# Patient Record
Sex: MALE | Race: WHITE | NOT HISPANIC OR LATINO | URBAN - METROPOLITAN AREA
[De-identification: names, ages, dates, MRNs, and addresses within clinical notes are randomized per-mention and may not be internally consistent; named-entity substitution may affect disease eponyms.]

---

## 2021-06-25 ENCOUNTER — INPATIENT (INPATIENT)
Facility: HOSPITAL | Age: 56
LOS: 5 days | Discharge: HOME | End: 2021-07-01
Attending: HOSPITALIST | Admitting: HOSPITALIST
Payer: MEDICAID

## 2021-06-25 VITALS
DIASTOLIC BLOOD PRESSURE: 88 MMHG | SYSTOLIC BLOOD PRESSURE: 146 MMHG | TEMPERATURE: 98 F | RESPIRATION RATE: 16 BRPM | HEART RATE: 116 BPM | OXYGEN SATURATION: 97 %

## 2021-06-25 DIAGNOSIS — R55 SYNCOPE AND COLLAPSE: ICD-10-CM

## 2021-06-25 LAB
ANION GAP SERPL CALC-SCNC: 10 MMOL/L — SIGNIFICANT CHANGE UP (ref 7–14)
BASOPHILS # BLD AUTO: 0.01 K/UL — SIGNIFICANT CHANGE UP (ref 0–0.2)
BASOPHILS NFR BLD AUTO: 0.2 % — SIGNIFICANT CHANGE UP (ref 0–2)
BUN SERPL-MCNC: 12 MG/DL — SIGNIFICANT CHANGE UP (ref 7–23)
CALCIUM SERPL-MCNC: 7.9 MG/DL — LOW (ref 8.4–10.5)
CHLORIDE SERPL-SCNC: 109 MMOL/L — HIGH (ref 98–107)
CO2 SERPL-SCNC: 21 MMOL/L — LOW (ref 22–31)
CREAT SERPL-MCNC: 0.71 MG/DL — SIGNIFICANT CHANGE UP (ref 0.5–1.3)
EOSINOPHIL # BLD AUTO: 0.03 K/UL — SIGNIFICANT CHANGE UP (ref 0–0.5)
EOSINOPHIL NFR BLD AUTO: 0.5 % — SIGNIFICANT CHANGE UP (ref 0–6)
GLUCOSE SERPL-MCNC: 112 MG/DL — HIGH (ref 70–99)
HCT VFR BLD CALC: 41.2 % — SIGNIFICANT CHANGE UP (ref 39–50)
HGB BLD-MCNC: 13.7 G/DL — SIGNIFICANT CHANGE UP (ref 13–17)
IANC: 4.38 K/UL — SIGNIFICANT CHANGE UP (ref 1.5–8.5)
IMM GRANULOCYTES NFR BLD AUTO: 0.3 % — SIGNIFICANT CHANGE UP (ref 0–1.5)
LYMPHOCYTES # BLD AUTO: 0.9 K/UL — LOW (ref 1–3.3)
LYMPHOCYTES # BLD AUTO: 15 % — SIGNIFICANT CHANGE UP (ref 13–44)
MAGNESIUM SERPL-MCNC: 1.8 MG/DL — SIGNIFICANT CHANGE UP (ref 1.6–2.6)
MCHC RBC-ENTMCNC: 30.9 PG — SIGNIFICANT CHANGE UP (ref 27–34)
MCHC RBC-ENTMCNC: 33.3 GM/DL — SIGNIFICANT CHANGE UP (ref 32–36)
MCV RBC AUTO: 93 FL — SIGNIFICANT CHANGE UP (ref 80–100)
MONOCYTES # BLD AUTO: 0.67 K/UL — SIGNIFICANT CHANGE UP (ref 0–0.9)
MONOCYTES NFR BLD AUTO: 11.1 % — SIGNIFICANT CHANGE UP (ref 2–14)
NEUTROPHILS # BLD AUTO: 4.38 K/UL — SIGNIFICANT CHANGE UP (ref 1.8–7.4)
NEUTROPHILS NFR BLD AUTO: 72.9 % — SIGNIFICANT CHANGE UP (ref 43–77)
NRBC # BLD: 0 /100 WBCS — SIGNIFICANT CHANGE UP
NRBC # FLD: 0 K/UL — SIGNIFICANT CHANGE UP
PHOSPHATE SERPL-MCNC: 2.2 MG/DL — LOW (ref 2.5–4.5)
PLATELET # BLD AUTO: 216 K/UL — SIGNIFICANT CHANGE UP (ref 150–400)
POTASSIUM SERPL-MCNC: 3.2 MMOL/L — LOW (ref 3.5–5.3)
POTASSIUM SERPL-SCNC: 3.2 MMOL/L — LOW (ref 3.5–5.3)
RBC # BLD: 4.43 M/UL — SIGNIFICANT CHANGE UP (ref 4.2–5.8)
RBC # FLD: 11.7 % — SIGNIFICANT CHANGE UP (ref 10.3–14.5)
SARS-COV-2 RNA SPEC QL NAA+PROBE: SIGNIFICANT CHANGE UP
SODIUM SERPL-SCNC: 140 MMOL/L — SIGNIFICANT CHANGE UP (ref 135–145)
WBC # BLD: 6.01 K/UL — SIGNIFICANT CHANGE UP (ref 3.8–10.5)
WBC # FLD AUTO: 6.01 K/UL — SIGNIFICANT CHANGE UP (ref 3.8–10.5)

## 2021-06-25 PROCEDURE — 99285 EMERGENCY DEPT VISIT HI MDM: CPT | Mod: 25

## 2021-06-25 PROCEDURE — 99233 SBSQ HOSP IP/OBS HIGH 50: CPT

## 2021-06-25 PROCEDURE — 93010 ELECTROCARDIOGRAM REPORT: CPT

## 2021-06-25 PROCEDURE — 70450 CT HEAD/BRAIN W/O DYE: CPT | Mod: 26

## 2021-06-25 RX ORDER — LEVETIRACETAM 250 MG/1
1500 TABLET, FILM COATED ORAL ONCE
Refills: 0 | Status: COMPLETED | OUTPATIENT
Start: 2021-06-25 | End: 2021-06-25

## 2021-06-25 RX ORDER — DEXAMETHASONE 0.5 MG/5ML
10 ELIXIR ORAL ONCE
Refills: 0 | Status: COMPLETED | OUTPATIENT
Start: 2021-06-25 | End: 2021-06-25

## 2021-06-25 RX ORDER — POTASSIUM CHLORIDE 20 MEQ
40 PACKET (EA) ORAL ONCE
Refills: 0 | Status: COMPLETED | OUTPATIENT
Start: 2021-06-25 | End: 2021-06-25

## 2021-06-25 RX ADMIN — LEVETIRACETAM 1500 MILLIGRAM(S): 250 TABLET, FILM COATED ORAL at 17:49

## 2021-06-25 RX ADMIN — Medication 102 MILLIGRAM(S): at 17:49

## 2021-06-25 RX ADMIN — Medication 10 MILLIGRAM(S): at 20:17

## 2021-06-25 RX ADMIN — Medication 40 MILLIEQUIVALENT(S): at 19:12
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
## 2021-06-25 NOTE — CONSULT NOTE ADULT - ATTENDING COMMENTS
Patient seen and examined - currently intact.  Has had what sound like focal seizures - left UE and LE shaking over the past several months.  Denies HA   CT results pending.  Agree with Keppra and Decadron.  Await MRI brain.
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
## 2021-06-25 NOTE — ED PROVIDER NOTE - PHYSICAL EXAMINATION
Physical Exam:  Gen: NAD, AOx3, non-toxic appearing, able to ambulate without assistance  Head: NCAT  HEENT: EOMI, PEERLA, normal conjunctiva, tongue midline, oral mucosa moist  Lung: CTAB, no respiratory distress, no wheezes/rhonchi/rales B/L, speaking in full sentences  CV: RRR, no murmurs, rubs or gallops, distal pulses 2+ b/l  Abd: soft, NT, ND, no guarding, no rigidity, no rebound tenderness, no CVA tenderness   MSK: no visible deformities, ROM normal in UE/LE, no back TTP  Neuro: CN II-XII intact, normal gait, 5/5 strength b/l, sensation intact b/l.   Skin: Warm, well perfused, no rash, no leg swelling  Psych: normal affect, calm
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
## 2021-06-26 DIAGNOSIS — E83.51 HYPOCALCEMIA: ICD-10-CM

## 2021-06-26 DIAGNOSIS — R21 RASH AND OTHER NONSPECIFIC SKIN ERUPTION: ICD-10-CM
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
## 2021-06-26 DIAGNOSIS — Z29.9 ENCOUNTER FOR PROPHYLACTIC MEASURES, UNSPECIFIED: ICD-10-CM

## 2021-06-26 DIAGNOSIS — R56.9 UNSPECIFIED CONVULSIONS: ICD-10-CM

## 2021-06-26 DIAGNOSIS — E83.39 OTHER DISORDERS OF PHOSPHORUS METABOLISM: ICD-10-CM

## 2021-06-26 DIAGNOSIS — D49.6 NEOPLASM OF UNSPECIFIED BEHAVIOR OF BRAIN: ICD-10-CM

## 2021-06-26 DIAGNOSIS — E87.6 HYPOKALEMIA: ICD-10-CM

## 2021-06-26 LAB
ALBUMIN SERPL ELPH-MCNC: 4 G/DL — SIGNIFICANT CHANGE UP (ref 3.3–5)
ALP SERPL-CCNC: 59 U/L — SIGNIFICANT CHANGE UP (ref 40–120)
ALT FLD-CCNC: 35 U/L — SIGNIFICANT CHANGE UP (ref 4–41)
ANION GAP SERPL CALC-SCNC: 13 MMOL/L — SIGNIFICANT CHANGE UP (ref 7–14)
AST SERPL-CCNC: 26 U/L — SIGNIFICANT CHANGE UP (ref 4–40)
BILIRUB SERPL-MCNC: 0.4 MG/DL — SIGNIFICANT CHANGE UP (ref 0.2–1.2)
BUN SERPL-MCNC: 18 MG/DL — SIGNIFICANT CHANGE UP (ref 7–23)
CALCIUM SERPL-MCNC: 9.1 MG/DL — SIGNIFICANT CHANGE UP (ref 8.4–10.5)
CHLORIDE SERPL-SCNC: 106 MMOL/L — SIGNIFICANT CHANGE UP (ref 98–107)
CO2 SERPL-SCNC: 21 MMOL/L — LOW (ref 22–31)
COVID-19 SPIKE DOMAIN AB INTERP: POSITIVE
COVID-19 SPIKE DOMAIN ANTIBODY RESULT: >250 U/ML — HIGH
CREAT SERPL-MCNC: 0.93 MG/DL — SIGNIFICANT CHANGE UP (ref 0.5–1.3)
GLUCOSE BLDC GLUCOMTR-MCNC: 135 MG/DL — HIGH (ref 70–99)
GLUCOSE BLDC GLUCOMTR-MCNC: 142 MG/DL — HIGH (ref 70–99)
GLUCOSE BLDC GLUCOMTR-MCNC: 282 MG/DL — HIGH (ref 70–99)
GLUCOSE SERPL-MCNC: 164 MG/DL — HIGH (ref 70–99)
HCT VFR BLD CALC: 43 % — SIGNIFICANT CHANGE UP (ref 39–50)
HGB BLD-MCNC: 14.3 G/DL — SIGNIFICANT CHANGE UP (ref 13–17)
INR BLD: 1.04 RATIO — SIGNIFICANT CHANGE UP (ref 0.88–1.16)
MAGNESIUM SERPL-MCNC: 2.1 MG/DL — SIGNIFICANT CHANGE UP (ref 1.6–2.6)
MCHC RBC-ENTMCNC: 31.2 PG — SIGNIFICANT CHANGE UP (ref 27–34)
MCHC RBC-ENTMCNC: 33.3 GM/DL — SIGNIFICANT CHANGE UP (ref 32–36)
MCV RBC AUTO: 93.9 FL — SIGNIFICANT CHANGE UP (ref 80–100)
NRBC # BLD: 0 /100 WBCS — SIGNIFICANT CHANGE UP
NRBC # FLD: 0 K/UL — SIGNIFICANT CHANGE UP
PHOSPHATE SERPL-MCNC: 2 MG/DL — LOW (ref 2.5–4.5)
PLATELET # BLD AUTO: 232 K/UL — SIGNIFICANT CHANGE UP (ref 150–400)
POTASSIUM SERPL-MCNC: 4.3 MMOL/L — SIGNIFICANT CHANGE UP (ref 3.5–5.3)
POTASSIUM SERPL-SCNC: 4.3 MMOL/L — SIGNIFICANT CHANGE UP (ref 3.5–5.3)
PROT SERPL-MCNC: 6.6 G/DL — SIGNIFICANT CHANGE UP (ref 6–8.3)
PROTHROM AB SERPL-ACNC: 11.9 SEC — SIGNIFICANT CHANGE UP (ref 10.6–13.6)
RBC # BLD: 4.58 M/UL — SIGNIFICANT CHANGE UP (ref 4.2–5.8)
RBC # FLD: 11.7 % — SIGNIFICANT CHANGE UP (ref 10.3–14.5)
SARS-COV-2 IGG+IGM SERPL QL IA: >250 U/ML — HIGH
SARS-COV-2 IGG+IGM SERPL QL IA: POSITIVE
SODIUM SERPL-SCNC: 140 MMOL/L — SIGNIFICANT CHANGE UP (ref 135–145)
WBC # BLD: 8.91 K/UL — SIGNIFICANT CHANGE UP (ref 3.8–10.5)
WBC # FLD AUTO: 8.91 K/UL — SIGNIFICANT CHANGE UP (ref 3.8–10.5)

## 2021-06-26 PROCEDURE — 70549 MR ANGIOGRAPH NECK W/O&W/DYE: CPT | Mod: 26

## 2021-06-26 PROCEDURE — 99223 1ST HOSP IP/OBS HIGH 75: CPT

## 2021-06-26 PROCEDURE — 70553 MRI BRAIN STEM W/O & W/DYE: CPT | Mod: 26

## 2021-06-26 PROCEDURE — 12345: CPT | Mod: NC

## 2021-06-26 RX ORDER — SODIUM,POTASSIUM PHOSPHATES 278-250MG
1 POWDER IN PACKET (EA) ORAL
Refills: 0 | Status: COMPLETED | OUTPATIENT
Start: 2021-06-26 | End: 2021-06-28

## 2021-06-26 RX ORDER — LEVETIRACETAM 250 MG/1
500 TABLET, FILM COATED ORAL
Refills: 0 | Status: DISCONTINUED | OUTPATIENT
Start: 2021-06-26 | End: 2021-06-26

## 2021-06-26 RX ORDER — DEXAMETHASONE 0.5 MG/5ML
4 ELIXIR ORAL EVERY 6 HOURS
Refills: 0 | Status: DISCONTINUED | OUTPATIENT
Start: 2021-06-26 | End: 2021-07-01

## 2021-06-26 RX ORDER — LEVETIRACETAM 250 MG/1
500 TABLET, FILM COATED ORAL EVERY 12 HOURS
Refills: 0 | Status: DISCONTINUED | OUTPATIENT
Start: 2021-06-26 | End: 2021-07-01

## 2021-06-26 RX ORDER — VALACYCLOVIR 500 MG/1
1000 TABLET, FILM COATED ORAL THREE TIMES A DAY
Refills: 0 | Status: DISCONTINUED | OUTPATIENT
Start: 2021-06-26 | End: 2021-06-30

## 2021-06-26 RX ORDER — PANTOPRAZOLE SODIUM 20 MG/1
40 TABLET, DELAYED RELEASE ORAL
Refills: 0 | Status: DISCONTINUED | OUTPATIENT
Start: 2021-06-26 | End: 2021-07-01

## 2021-06-26 RX ADMIN — Medication 4 MILLIGRAM(S): at 05:19

## 2021-06-26 RX ADMIN — PANTOPRAZOLE SODIUM 40 MILLIGRAM(S): 20 TABLET, DELAYED RELEASE ORAL at 21:51

## 2021-06-26 RX ADMIN — Medication 1 TABLET(S): at 21:51

## 2021-06-26 RX ADMIN — LEVETIRACETAM 400 MILLIGRAM(S): 250 TABLET, FILM COATED ORAL at 17:20

## 2021-06-26 RX ADMIN — VALACYCLOVIR 1000 MILLIGRAM(S): 500 TABLET, FILM COATED ORAL at 17:19

## 2021-06-26 RX ADMIN — VALACYCLOVIR 1000 MILLIGRAM(S): 500 TABLET, FILM COATED ORAL at 09:47

## 2021-06-26 RX ADMIN — Medication 1 TABLET(S): at 17:20

## 2021-06-26 RX ADMIN — Medication 1 TABLET(S): at 11:53

## 2021-06-26 RX ADMIN — Medication 4 MILLIGRAM(S): at 23:35

## 2021-06-26 RX ADMIN — Medication 4 MILLIGRAM(S): at 11:53

## 2021-06-26 RX ADMIN — VALACYCLOVIR 1000 MILLIGRAM(S): 500 TABLET, FILM COATED ORAL at 21:51

## 2021-06-26 RX ADMIN — LEVETIRACETAM 400 MILLIGRAM(S): 250 TABLET, FILM COATED ORAL at 05:20

## 2021-06-26 RX ADMIN — Medication 4 MILLIGRAM(S): at 17:20

## 2021-06-26 NOTE — H&P ADULT - PROBLEM SELECTOR PLAN 4
Serum Ca 7.9 on admission. No corresponding albumin.  - F/u AM CMP for repeat serum Ca and to check albumin  - EKG with no acute changes Nonpruritic. Appears to consist of clusters of crusted over blisters. Localized to single dermatome.   - Despite being >72 hrs since onset of rash and no fresh blisters on exam, will start Valacyclovir 1000 mg tid given that pt is now on steroids  - Contact precautions
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
## 2021-06-26 NOTE — H&P ADULT - PROBLEM SELECTOR PLAN 6
Serum phos 2.2 on admission.  - Will replete with PO phos  - Monitor serum phos Serum K 3.2 on admission.   - S/p PO Potassium 40 mEq x1 in ED  - F/u AM CMP for repeat serum K  - EKG with no acute changes
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
## 2021-06-26 NOTE — H&P ADULT - PROBLEM SELECTOR PLAN 1
CTH noncontrast with 2.0 x 1.9 cm hyperattenuating lesion in the lateral right parietotemporal lobe with mild surrounding vasogenic edema.  - Neurosurgery and Neurology consults obtained overnight, appreciate recs  - Per neurosurgery, no acute neurosurgical intervention  - MRI brain w/wo contrast, MRA head wo contrast, MRA neck w/wo contrast ordered  - Spot EEG ordered  - S/p IV Decadron 10 mg x1 in ED. C/w IV Decadron 4 mg q6hrs.   - S/p IV Keppra load of 1500 mg x1 in ED. C/w IV Keppra 500 mg bid.   - CT C/A/P with IV contrast ordered to evaluate for possible malignancy  - S&S eval  - Neuro checks per routine  - Aspiration precautions, fall risk protocol, and seizure precautions CTH noncontrast with 2.0 x 1.9 cm hyperattenuating lesion in the lateral right parietotemporal lobe with mild surrounding vasogenic edema.  - Neurosurgery and Neurology consults obtained overnight, appreciate recs  - Per neurosurgery, no acute neurosurgical intervention  - MRI brain w/wo contrast, MRA head wo contrast, MRA neck w/wo contrast ordered  - Spot EEG ordered  - S/p IV Decadron 10 mg x1 in ED. C/w IV Decadron 4 mg q6hrs. Will start PO Protonix 40 mg daily and FS checks qAC and qhs while on steroids.   - S/p IV Keppra load of 1500 mg x1 in ED. C/w IV Keppra 500 mg bid.   - CT C/A/P with IV contrast ordered to evaluate for possible malignancy  - S&S eval  - Neuro checks per routine  - Aspiration precautions, fall risk protocol, and seizure precautions
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
## 2021-06-26 NOTE — H&P ADULT - NSHPREVIEWOFSYSTEMS_GEN_ALL_CORE
Constitutional: No generalized weakness, fevers, chills, or weight loss  Eyes: No visual changes, double vision, or eye pain  Ears, Nose, Mouth, Throat: No runny nose, sinus pain, ear pain, tinnitus, sore throat, dysphagia, or odynophagia  Cardiovascular: No chest pain, palpitations, or LE edema  Respiratory: No cough, wheezing, hemoptysis, or shortness of breath  Gastrointestinal: No abdominal pain, nausea/vomiting, diarrhea/constipation, hematemesis, melena, or BRBPR  Genitourinary: No dysuria, frequency, urgency, or hematuria  Musculoskeletal: No joint pain, joint swelling, or decreased ROM  Skin: +Nonpruritic rash. No pruritus, rashes, lesions, or wounds  Neurologic:  No seizures, headache, paresthesias, numbness, or limb weakness  Psychiatric: No depression, anxiety, difficulty concentrating, anhedonia, or lack of energy  Endocrine: No heat/cold intolerance, mood swings, sweats, polydipsia, or polyuria  Hematologic/lymphatic: No purpura, petechia, or prolonged or excessive bleeding after dental extraction / injury  Allergic/Immunologic: No anaphylaxis or allergic response to materials, foods, animals    Positives and pertinent negatives noted and all other systems negative. Constitutional: No generalized weakness, fevers, chills, or weight loss  Eyes: No visual changes, double vision, or eye pain  Ears, Nose, Mouth, Throat: No runny nose, sinus pain, ear pain, tinnitus, sore throat, dysphagia, or odynophagia  Cardiovascular: No chest pain, palpitations, or LE edema  Respiratory: No cough, wheezing, hemoptysis, or shortness of breath  Gastrointestinal: No abdominal pain, nausea/vomiting, diarrhea/constipation, hematemesis, melena, or BRBPR  Genitourinary: No dysuria, frequency, urgency, or hematuria  Musculoskeletal: No joint pain, joint swelling, or decreased ROM  Skin: +Nonpruritic rash.   Neurologic: +Seizure. No syncope, headache, paresthesias, numbness, or limb weakness.  Psychiatric: No depression, anxiety, or agitation  Endocrine: No heat/cold intolerance, mood swings, sweats, polydipsia, or polyuria  Hematologic/lymphatic: No purpura, petechia, or prolonged or excessive bleeding after dental extraction / injury  Allergic/Immunologic: No anaphylaxis or allergic response to materials, foods, animals    Positives and pertinent negatives noted and all other systems negative.
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
## 2021-06-26 NOTE — H&P ADULT - NSHPPHYSICALEXAM_GEN_ALL_CORE
Vital Signs Last 24 Hrs  T(C): 36.7 (26 Jun 2021 05:01), Max: 36.8 (25 Jun 2021 14:38)  T(F): 98 (26 Jun 2021 05:01), Max: 98.3 (25 Jun 2021 14:38)  HR: 74 (26 Jun 2021 05:01) (60 - 116)  BP: 108/68 (26 Jun 2021 05:01) (108/68 - 146/88)  BP(mean): --  RR: 18 (26 Jun 2021 05:01) (16 - 18)  SpO2: 97% (26 Jun 2021 05:01) (97% - 100%)    PHYSICAL EXAM:  General: Awake and alert.  No acute distress.  Head: Normocephalic, atraumatic.    Eyes: PERRL.  EOMI.  No scleral icterus.  No conjunctival pallor.  Mouth: Moist MM.  No oropharyngeal exudates.    Neck: Supple.  Full range of motion.  No JVD.  Tender R anterior cervical LAD.  No thyromegaly.  Trachea midline.    Heart: RRR.  Normal S1 and S2.  No murmurs, rubs, or gallops.  No LE edema b/l.   Lungs: Nonlabored breathing.  Good inspiratory effort.  CTAB.  No wheezes, crackles, or rhonchi.    Abdomen: BS+, soft, NT/ND.  No hepatomegaly.  No flank ecchymoses b/l.   Skin: Warm and dry.  Multiple erythematous clusters of pinpoint bumps, some scabbed over, on R side of neck, R upper scapula, and R upper pectoral region.   Extremities: No cyanosis.  2+ peripheral pulses b/l.  Musculoskeletal: No joint deformities.  No spinal or paraspinal tenderness.  No step-offs.   Neuro: A&Ox3.  CN II-XII intact.  5/5 motor strength in UE and LE b/l.  Tactile sensation intact in UE and LE b/l.  Cerebellar function intact as assessed by finger-to-nose test.  No pronator drift.  No focal deficits. Vital Signs Last 24 Hrs  T(C): 36.7 (26 Jun 2021 05:01), Max: 36.8 (25 Jun 2021 14:38)  T(F): 98 (26 Jun 2021 05:01), Max: 98.3 (25 Jun 2021 14:38)  HR: 74 (26 Jun 2021 05:01) (60 - 116)  BP: 108/68 (26 Jun 2021 05:01) (108/68 - 146/88)  BP(mean): --  RR: 18 (26 Jun 2021 05:01) (16 - 18)  SpO2: 97% (26 Jun 2021 05:01) (97% - 100%)    PHYSICAL EXAM:  General: Awake and alert.  No acute distress.  Head: Normocephalic, atraumatic.    Eyes: PERRL.  EOMI.  No scleral icterus.  No conjunctival pallor.  Mouth: Moist MM.  No oropharyngeal exudates.    Neck: Supple.  Full range of motion.  No JVD.  Tender R anterior cervical LAD.  No thyromegaly.  Trachea midline.    Heart: RRR.  Normal S1 and S2.  No murmurs, rubs, or gallops.  No LE edema b/l.   Lungs: Nonlabored breathing.  Good inspiratory effort.  CTAB.  No wheezes, crackles, or rhonchi.    Abdomen: BS+, soft, NT/ND.  No hepatomegaly.  No flank ecchymoses b/l.   Skin: Warm and dry.  Multiple erythematous clusters of pinpoint bumps, some crusted over, on R side of neck, R upper scapula, and R upper pectoral region.   Extremities: No cyanosis.  2+ peripheral pulses b/l.  Musculoskeletal: No joint deformities.  No spinal or paraspinal tenderness.  No step-offs.   Neuro: A&Ox3.  CN II-XII intact.  5/5 motor strength in UE and LE b/l.  Tactile sensation intact in UE and LE b/l.  Cerebellar function intact as assessed by finger-to-nose test.  No pronator drift.  No focal deficits.

## 2021-06-26 NOTE — H&P ADULT - PROBLEM SELECTOR PLAN 8
- DVT ppx: Hold pharmacologic ppx for now pending MRI/MRA results  - Diet: Regular, pt reports no dysphagia or odynophagia
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
## 2021-06-26 NOTE — H&P ADULT - PROBLEM SELECTOR PLAN 5
Serum K 3.2 on admission.   - S/p PO Potassium 40 mEq x1 in ED  - F/u AM CMP for repeat serum K  - EKG with no acute changes Serum Ca 7.9 on admission. No corresponding albumin.  - F/u AM CMP for repeat serum Ca and to check albumin  - EKG with no acute changes

## 2021-06-26 NOTE — PHYSICAL THERAPY INITIAL EVALUATION ADULT - PATIENT PROFILE REVIEW, REHAB EVAL
PT orders received: out of bed with assistance. Consult with FLORENCE Berman, patient may participate in PT evaluation./yes PT orders received: out of bed with assistance. Consult with FLORENCE DOMINGUEZ, patient may participate in PT evaluation./yes

## 2021-06-26 NOTE — H&P ADULT - NSHPSOCIALHISTORY_GEN_ALL_CORE
Tobacco: Former smoker, quit ~20 years ago around time of brain tumor diagnosis  Alcohol: Infrequent use of beer  Denies any history of illicit drug use.  . Works in pest control.

## 2021-06-26 NOTE — PHYSICAL THERAPY INITIAL EVALUATION ADULT - ADDITIONAL COMMENTS
Pt lives in a private house with (+) flight of stairs to negotiate. Prior to admission, pt endorses ambulating independently, no assistive device. Pt lives in a private house with (+) flight of stairs to negotiate. Prior to admission, pt endorses ambulating independently, no assistive device.    Pt was left semi-supine with head of bed elevated to 30°, all lines/tubes intact and call bell within reach, RN aware.
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
## 2021-06-26 NOTE — PROGRESS NOTE ADULT - SUBJECTIVE AND OBJECTIVE BOX
Patient is a 56y old  Male who presents with a chief complaint of Seizure and R brain lesion (26 Jun 2021 04:14)    SUBJECTIVE / OVERNIGHT EVENTS: Patient seen and examined. Denies any further seizure activity. No pain. Feels well. Understands that he needs to stay for MRI    MEDICATIONS  (STANDING):  dexAMETHasone  Injectable 4 milliGRAM(s) IV Push every 6 hours  levETIRAcetam  IVPB 500 milliGRAM(s) IV Intermittent every 12 hours  pantoprazole    Tablet 40 milliGRAM(s) Oral before breakfast  potassium phosphate / sodium phosphate Tablet (K-PHOS No. 2) 1 Tablet(s) Oral four times a day with meals  valACYclovir 1000 milliGRAM(s) Oral three times a day    MEDICATIONS  (PRN):      CAPILLARY BLOOD GLUCOSE  POCT Blood Glucose.: 135 mg/dL (26 Jun 2021 12:06)    I&O's Summary    Vital Signs Last 24 Hrs  T(C): 36.3 (26 Jun 2021 11:51), Max: 36.8 (25 Jun 2021 14:38)  T(F): 97.3 (26 Jun 2021 11:51), Max: 98.3 (25 Jun 2021 14:38)  HR: 79 (26 Jun 2021 11:51) (60 - 116)  BP: 103/63 (26 Jun 2021 11:51) (103/63 - 146/88)  BP(mean): --  RR: 18 (26 Jun 2021 11:51) (16 - 18)  SpO2: 97% (26 Jun 2021 11:51) (97% - 100%)    PHYSICAL EXAM:  GENERAL: NAD, well-developed  HEAD:  Atraumatic, Normocephalic  EYES: EOMI, PERRLA, conjunctiva and sclera clear  NECK: Supple, No JVD  CHEST/LUNG: Clear to auscultation bilaterally; No wheeze  HEART: Regular rate and rhythm; No murmurs, rubs, or gallops  ABDOMEN: Soft, Nontender, Nondistended; Bowel sounds present  EXTREMITIES:  2+ Peripheral Pulses, No clubbing, cyanosis, or edema  PSYCH: AAOx3  NEUROLOGY: non-focal  SKIN: No rashes or lesions    LABS:                        14.3   8.91  )-----------( 232      ( 26 Jun 2021 07:10 )             43.0     06-26    140  |  106  |  18  ----------------------------<  164<H>  4.3   |  21<L>  |  0.93    Ca    9.1      26 Jun 2021 07:10  Phos  2.0     06-26  Mg     2.1     06-26    TPro  6.6  /  Alb  4.0  /  TBili  0.4  /  DBili  x   /  AST  26  /  ALT  35  /  AlkPhos  59  06-26    PT/INR - ( 26 Jun 2021 07:10 )   PT: 11.9 sec;   INR: 1.04 ratio      RADIOLOGY & ADDITIONAL TESTS: < from: CT Head No Cont (06.25.21 @ 16:58) >  IMPRESSION:  -2.0 cm right parietotemporal hyperattenuating lesion with mild surrounding vasogenic edema. Recommend MRI with contrast for further evaluation due to lack of any prior imaging.    -No acute intracranial hemorrhage, midline shift or hydrocephalus.    < end of copied text >        Imaging Personally Reviewed:    Consultant(s) Notes Reviewed:  neuro and neurosx    Care Discussed with Consultants/Other Providers:    Assessment and Plan:

## 2021-06-26 NOTE — PHYSICAL THERAPY INITIAL EVALUATION ADULT - DISCHARGE DISPOSITION, PT EVAL
discharge home; no skilled PT needs. Will follow for 1-2 more sessions while at [facility].

## 2021-06-26 NOTE — PHYSICAL THERAPY INITIAL EVALUATION ADULT - FOLLOWS COMMANDS/ANSWERS QUESTIONS, REHAB EVAL
Mainly Urdu speaking but able to make needs known and follow commands in English./100% of the time/able to follow single-step instructions

## 2021-06-26 NOTE — H&P ADULT - PROBLEM SELECTOR PLAN 7
- DVT ppx: Hold pharmacologic ppx for now pending MRI/MRA results  - Diet: Regular, pt reports no dysphagia or odynophagia Serum phos 2.2 on admission.  - Will replete with PO phos  - Monitor serum phos
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
## 2021-06-27 LAB
A1C WITH ESTIMATED AVERAGE GLUCOSE RESULT: 5.6 % — SIGNIFICANT CHANGE UP (ref 4–5.6)
ANION GAP SERPL CALC-SCNC: 15 MMOL/L — HIGH (ref 7–14)
BASOPHILS # BLD AUTO: 0.02 K/UL — SIGNIFICANT CHANGE UP (ref 0–0.2)
BASOPHILS NFR BLD AUTO: 0.1 % — SIGNIFICANT CHANGE UP (ref 0–2)
BUN SERPL-MCNC: 25 MG/DL — HIGH (ref 7–23)
CALCIUM SERPL-MCNC: 9.2 MG/DL — SIGNIFICANT CHANGE UP (ref 8.4–10.5)
CHLORIDE SERPL-SCNC: 105 MMOL/L — SIGNIFICANT CHANGE UP (ref 98–107)
CO2 SERPL-SCNC: 20 MMOL/L — LOW (ref 22–31)
CREAT SERPL-MCNC: 0.79 MG/DL — SIGNIFICANT CHANGE UP (ref 0.5–1.3)
EOSINOPHIL # BLD AUTO: 0 K/UL — SIGNIFICANT CHANGE UP (ref 0–0.5)
EOSINOPHIL NFR BLD AUTO: 0 % — SIGNIFICANT CHANGE UP (ref 0–6)
ESTIMATED AVERAGE GLUCOSE: 114 MG/DL — SIGNIFICANT CHANGE UP (ref 68–114)
GLUCOSE BLDC GLUCOMTR-MCNC: 130 MG/DL — HIGH (ref 70–99)
GLUCOSE BLDC GLUCOMTR-MCNC: 144 MG/DL — HIGH (ref 70–99)
GLUCOSE BLDC GLUCOMTR-MCNC: 148 MG/DL — HIGH (ref 70–99)
GLUCOSE BLDC GLUCOMTR-MCNC: 190 MG/DL — HIGH (ref 70–99)
GLUCOSE SERPL-MCNC: 148 MG/DL — HIGH (ref 70–99)
HCT VFR BLD CALC: 42.6 % — SIGNIFICANT CHANGE UP (ref 39–50)
HCV AB S/CO SERPL IA: 0.1 S/CO — SIGNIFICANT CHANGE UP (ref 0–0.99)
HCV AB SERPL-IMP: SIGNIFICANT CHANGE UP
HGB BLD-MCNC: 14.1 G/DL — SIGNIFICANT CHANGE UP (ref 13–17)
IANC: 14.1 K/UL — HIGH (ref 1.5–8.5)
IMM GRANULOCYTES NFR BLD AUTO: 0.5 % — SIGNIFICANT CHANGE UP (ref 0–1.5)
LYMPHOCYTES # BLD AUTO: 1.48 K/UL — SIGNIFICANT CHANGE UP (ref 1–3.3)
LYMPHOCYTES # BLD AUTO: 9.1 % — LOW (ref 13–44)
MAGNESIUM SERPL-MCNC: 2.2 MG/DL — SIGNIFICANT CHANGE UP (ref 1.6–2.6)
MCHC RBC-ENTMCNC: 31.1 PG — SIGNIFICANT CHANGE UP (ref 27–34)
MCHC RBC-ENTMCNC: 33.1 GM/DL — SIGNIFICANT CHANGE UP (ref 32–36)
MCV RBC AUTO: 94 FL — SIGNIFICANT CHANGE UP (ref 80–100)
MONOCYTES # BLD AUTO: 0.64 K/UL — SIGNIFICANT CHANGE UP (ref 0–0.9)
MONOCYTES NFR BLD AUTO: 3.9 % — SIGNIFICANT CHANGE UP (ref 2–14)
NEUTROPHILS # BLD AUTO: 14.1 K/UL — HIGH (ref 1.8–7.4)
NEUTROPHILS NFR BLD AUTO: 86.4 % — HIGH (ref 43–77)
NRBC # BLD: 0 /100 WBCS — SIGNIFICANT CHANGE UP
NRBC # FLD: 0 K/UL — SIGNIFICANT CHANGE UP
PHOSPHATE SERPL-MCNC: 3.1 MG/DL — SIGNIFICANT CHANGE UP (ref 2.5–4.5)
PLATELET # BLD AUTO: 228 K/UL — SIGNIFICANT CHANGE UP (ref 150–400)
POTASSIUM SERPL-MCNC: 4.2 MMOL/L — SIGNIFICANT CHANGE UP (ref 3.5–5.3)
POTASSIUM SERPL-SCNC: 4.2 MMOL/L — SIGNIFICANT CHANGE UP (ref 3.5–5.3)
RBC # BLD: 4.53 M/UL — SIGNIFICANT CHANGE UP (ref 4.2–5.8)
RBC # FLD: 11.9 % — SIGNIFICANT CHANGE UP (ref 10.3–14.5)
SODIUM SERPL-SCNC: 140 MMOL/L — SIGNIFICANT CHANGE UP (ref 135–145)
WBC # BLD: 16.32 K/UL — HIGH (ref 3.8–10.5)
WBC # FLD AUTO: 16.32 K/UL — HIGH (ref 3.8–10.5)

## 2021-06-27 PROCEDURE — 99233 SBSQ HOSP IP/OBS HIGH 50: CPT

## 2021-06-27 RX ORDER — DEXTROSE 50 % IN WATER 50 %
25 SYRINGE (ML) INTRAVENOUS ONCE
Refills: 0 | Status: DISCONTINUED | OUTPATIENT
Start: 2021-06-27 | End: 2021-07-01

## 2021-06-27 RX ORDER — SODIUM CHLORIDE 9 MG/ML
1000 INJECTION, SOLUTION INTRAVENOUS
Refills: 0 | Status: DISCONTINUED | OUTPATIENT
Start: 2021-06-27 | End: 2021-07-01

## 2021-06-27 RX ORDER — DEXTROSE 50 % IN WATER 50 %
15 SYRINGE (ML) INTRAVENOUS ONCE
Refills: 0 | Status: DISCONTINUED | OUTPATIENT
Start: 2021-06-27 | End: 2021-07-01

## 2021-06-27 RX ORDER — GLUCAGON INJECTION, SOLUTION 0.5 MG/.1ML
1 INJECTION, SOLUTION SUBCUTANEOUS ONCE
Refills: 0 | Status: DISCONTINUED | OUTPATIENT
Start: 2021-06-27 | End: 2021-07-01

## 2021-06-27 RX ORDER — INSULIN LISPRO 100/ML
VIAL (ML) SUBCUTANEOUS AT BEDTIME
Refills: 0 | Status: DISCONTINUED | OUTPATIENT
Start: 2021-06-26 | End: 2021-07-01

## 2021-06-27 RX ORDER — INSULIN LISPRO 100/ML
VIAL (ML) SUBCUTANEOUS
Refills: 0 | Status: DISCONTINUED | OUTPATIENT
Start: 2021-06-26 | End: 2021-07-01

## 2021-06-27 RX ORDER — DEXTROSE 50 % IN WATER 50 %
12.5 SYRINGE (ML) INTRAVENOUS ONCE
Refills: 0 | Status: DISCONTINUED | OUTPATIENT
Start: 2021-06-27 | End: 2021-07-01

## 2021-06-27 RX ADMIN — Medication 1: at 12:58

## 2021-06-27 RX ADMIN — LEVETIRACETAM 400 MILLIGRAM(S): 250 TABLET, FILM COATED ORAL at 17:33

## 2021-06-27 RX ADMIN — Medication 1 TABLET(S): at 21:34

## 2021-06-27 RX ADMIN — Medication 1 TABLET(S): at 09:00

## 2021-06-27 RX ADMIN — Medication 4 MILLIGRAM(S): at 17:34

## 2021-06-27 RX ADMIN — VALACYCLOVIR 1000 MILLIGRAM(S): 500 TABLET, FILM COATED ORAL at 21:34

## 2021-06-27 RX ADMIN — LEVETIRACETAM 400 MILLIGRAM(S): 250 TABLET, FILM COATED ORAL at 05:53

## 2021-06-27 RX ADMIN — Medication 4 MILLIGRAM(S): at 12:57

## 2021-06-27 RX ADMIN — PANTOPRAZOLE SODIUM 40 MILLIGRAM(S): 20 TABLET, DELAYED RELEASE ORAL at 05:54

## 2021-06-27 RX ADMIN — VALACYCLOVIR 1000 MILLIGRAM(S): 500 TABLET, FILM COATED ORAL at 05:54

## 2021-06-27 RX ADMIN — Medication 4 MILLIGRAM(S): at 05:53

## 2021-06-27 RX ADMIN — Medication 1 TABLET(S): at 17:33

## 2021-06-27 RX ADMIN — Medication 4 MILLIGRAM(S): at 23:16

## 2021-06-27 RX ADMIN — Medication 1 TABLET(S): at 12:57

## 2021-06-27 RX ADMIN — VALACYCLOVIR 1000 MILLIGRAM(S): 500 TABLET, FILM COATED ORAL at 15:34

## 2021-06-27 NOTE — PROGRESS NOTE ADULT - SUBJECTIVE AND OBJECTIVE BOX
Patient is a 56y old  Male who presents with a chief complaint of Seizure and R brain lesion (26 Jun 2021 04:14)    SUBJECTIVE / OVERNIGHT EVENTS: Patient seen and examined. Denies any further seizure activity. No pain. Feels well. Understands that he needs to stay for EEG and await MRI results.     MEDICATIONS  (STANDING):  dexAMETHasone  Injectable 4 milliGRAM(s) IV Push every 6 hours  levETIRAcetam  IVPB 500 milliGRAM(s) IV Intermittent every 12 hours  pantoprazole    Tablet 40 milliGRAM(s) Oral before breakfast  potassium phosphate / sodium phosphate Tablet (K-PHOS No. 2) 1 Tablet(s) Oral four times a day with meals  valACYclovir 1000 milliGRAM(s) Oral three times a day    MEDICATIONS  (PRN):      CAPILLARY BLOOD GLUCOSE  POCT Blood Glucose.: 135 mg/dL (26 Jun 2021 12:06)    I&O's Summary    Vital Signs Last 24 Hrs  T(C): 36.7 (27 Jun 2021 05:22), Max: 36.8 (26 Jun 2021 15:51)  T(F): 98 (27 Jun 2021 05:22), Max: 98.3 (26 Jun 2021 17:21)  HR: 59 (27 Jun 2021 05:22) (59 - 79)  BP: 115/70 (27 Jun 2021 05:22) (103/63 - 115/70)  BP(mean): --  RR: 18 (27 Jun 2021 05:22) (18 - 18)  SpO2: 96% (27 Jun 2021 05:22) (96% - 97%)    PHYSICAL EXAM:  GENERAL: NAD, well-developed  HEAD:  Atraumatic, Normocephalic  EYES: EOMI, PERRLA, conjunctiva and sclera clear  NECK: Supple, No JVD  CHEST/LUNG: Clear to auscultation bilaterally; No wheeze  HEART: Regular rate and rhythm; No murmurs, rubs, or gallops  ABDOMEN: Soft, Nontender, Nondistended; Bowel sounds present  EXTREMITIES:  2+ Peripheral Pulses, No clubbing, cyanosis, or edema  PSYCH: AAOx3  NEUROLOGY: non-focal  SKIN: No rashes or lesions    LABS:                                   14.1   16.32 )-----------( 228      ( 27 Jun 2021 06:27 )             42.6   06-27    140  |  105  |  25<H>  ----------------------------<  148<H>  4.2   |  20<L>  |  0.79    Ca    9.2      27 Jun 2021 06:27  Phos  3.1     06-27  Mg     2.2     06-27    TPro  6.6  /  Alb  4.0  /  TBili  0.4  /  DBili  x   /  AST  26  /  ALT  35  /  AlkPhos  59  06-26      RADIOLOGY & ADDITIONAL TESTS: < from: CT Head No Cont (06.25.21 @ 16:58) >  IMPRESSION:  -2.0 cm right parietotemporal hyperattenuating lesion with mild surrounding vasogenic edema. Recommend MRI with contrast for further evaluation due to lack of any prior imaging.    -No acute intracranial hemorrhage, midline shift or hydrocephalus.    < end of copied text >        Imaging Personally Reviewed:    Consultant(s) Notes Reviewed:  neuro and neurosx    Care Discussed with Consultants/Other Providers:    Assessment and Plan:
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
## 2021-06-27 NOTE — CHART NOTE - NSCHARTNOTEFT_GEN_A_CORE
Initial 4h 40 minutes of record reviewed.  There are no epileptiform abnormalities noted. Background is continuous.   Full report to follow after review of completed study tomorrow.     Nicolas Drake MD PhD  Director, Epilepsy Division, [facility] EEG Reading Room Ph#: [phone]  Epilepsy Answering Service after 5PM and before 8:30AM: Ph#: [phone]

## 2021-06-28 LAB
ANION GAP SERPL CALC-SCNC: 13 MMOL/L — SIGNIFICANT CHANGE UP (ref 7–14)
BASOPHILS # BLD AUTO: 0.02 K/UL — SIGNIFICANT CHANGE UP (ref 0–0.2)
BASOPHILS NFR BLD AUTO: 0.1 % — SIGNIFICANT CHANGE UP (ref 0–2)
BUN SERPL-MCNC: 24 MG/DL — HIGH (ref 7–23)
CALCIUM SERPL-MCNC: 9 MG/DL — SIGNIFICANT CHANGE UP (ref 8.4–10.5)
CHLORIDE SERPL-SCNC: 104 MMOL/L — SIGNIFICANT CHANGE UP (ref 98–107)
CO2 SERPL-SCNC: 21 MMOL/L — LOW (ref 22–31)
CREAT SERPL-MCNC: 0.79 MG/DL — SIGNIFICANT CHANGE UP (ref 0.5–1.3)
EOSINOPHIL # BLD AUTO: 0 K/UL — SIGNIFICANT CHANGE UP (ref 0–0.5)
EOSINOPHIL NFR BLD AUTO: 0 % — SIGNIFICANT CHANGE UP (ref 0–6)
GLUCOSE BLDC GLUCOMTR-MCNC: 128 MG/DL — HIGH (ref 70–99)
GLUCOSE SERPL-MCNC: 138 MG/DL — HIGH (ref 70–99)
HCT VFR BLD CALC: 41.9 % — SIGNIFICANT CHANGE UP (ref 39–50)
HGB BLD-MCNC: 14.1 G/DL — SIGNIFICANT CHANGE UP (ref 13–17)
IANC: 12.61 K/UL — HIGH (ref 1.5–8.5)
IMM GRANULOCYTES NFR BLD AUTO: 0.9 % — SIGNIFICANT CHANGE UP (ref 0–1.5)
LYMPHOCYTES # BLD AUTO: 13.4 % — SIGNIFICANT CHANGE UP (ref 13–44)
LYMPHOCYTES # BLD AUTO: 2.14 K/UL — SIGNIFICANT CHANGE UP (ref 1–3.3)
MAGNESIUM SERPL-MCNC: 2.1 MG/DL — SIGNIFICANT CHANGE UP (ref 1.6–2.6)
MCHC RBC-ENTMCNC: 31.6 PG — SIGNIFICANT CHANGE UP (ref 27–34)
MCHC RBC-ENTMCNC: 33.7 GM/DL — SIGNIFICANT CHANGE UP (ref 32–36)
MCV RBC AUTO: 93.9 FL — SIGNIFICANT CHANGE UP (ref 80–100)
MONOCYTES # BLD AUTO: 1.09 K/UL — HIGH (ref 0–0.9)
MONOCYTES NFR BLD AUTO: 6.8 % — SIGNIFICANT CHANGE UP (ref 2–14)
NEUTROPHILS # BLD AUTO: 12.61 K/UL — HIGH (ref 1.8–7.4)
NEUTROPHILS NFR BLD AUTO: 78.8 % — HIGH (ref 43–77)
NRBC # BLD: 0 /100 WBCS — SIGNIFICANT CHANGE UP
NRBC # FLD: 0 K/UL — SIGNIFICANT CHANGE UP
PHOSPHATE SERPL-MCNC: 4.1 MG/DL — SIGNIFICANT CHANGE UP (ref 2.5–4.5)
PLATELET # BLD AUTO: 245 K/UL — SIGNIFICANT CHANGE UP (ref 150–400)
POTASSIUM SERPL-MCNC: 4.1 MMOL/L — SIGNIFICANT CHANGE UP (ref 3.5–5.3)
POTASSIUM SERPL-SCNC: 4.1 MMOL/L — SIGNIFICANT CHANGE UP (ref 3.5–5.3)
RBC # BLD: 4.46 M/UL — SIGNIFICANT CHANGE UP (ref 4.2–5.8)
RBC # FLD: 11.9 % — SIGNIFICANT CHANGE UP (ref 10.3–14.5)
SODIUM SERPL-SCNC: 138 MMOL/L — SIGNIFICANT CHANGE UP (ref 135–145)
WBC # BLD: 16.01 K/UL — HIGH (ref 3.8–10.5)
WBC # FLD AUTO: 16.01 K/UL — HIGH (ref 3.8–10.5)

## 2021-06-28 PROCEDURE — 99233 SBSQ HOSP IP/OBS HIGH 50: CPT

## 2021-06-28 RX ADMIN — LEVETIRACETAM 400 MILLIGRAM(S): 250 TABLET, FILM COATED ORAL at 16:17

## 2021-06-28 RX ADMIN — Medication 4 MILLIGRAM(S): at 06:11

## 2021-06-28 RX ADMIN — VALACYCLOVIR 1000 MILLIGRAM(S): 500 TABLET, FILM COATED ORAL at 22:04

## 2021-06-28 RX ADMIN — VALACYCLOVIR 1000 MILLIGRAM(S): 500 TABLET, FILM COATED ORAL at 06:12

## 2021-06-28 RX ADMIN — Medication 4 MILLIGRAM(S): at 11:14

## 2021-06-28 RX ADMIN — Medication 4 MILLIGRAM(S): at 16:15

## 2021-06-28 RX ADMIN — Medication 2: at 17:56

## 2021-06-28 RX ADMIN — Medication 1: at 13:01

## 2021-06-28 RX ADMIN — Medication 4 MILLIGRAM(S): at 23:44

## 2021-06-28 RX ADMIN — PANTOPRAZOLE SODIUM 40 MILLIGRAM(S): 20 TABLET, DELAYED RELEASE ORAL at 06:11

## 2021-06-28 RX ADMIN — VALACYCLOVIR 1000 MILLIGRAM(S): 500 TABLET, FILM COATED ORAL at 15:01

## 2021-06-28 RX ADMIN — Medication 1 TABLET(S): at 06:54

## 2021-06-28 RX ADMIN — LEVETIRACETAM 400 MILLIGRAM(S): 250 TABLET, FILM COATED ORAL at 06:55

## 2021-06-28 NOTE — EEG REPORT - NS EEG TEXT BOX
[facility]  Comprehensive Epilepsy Center  Report of Continuous Video EEG    [facility]: [address], [place] [address], Phone [phone]  [facility]: [address] [place], [place] [address] [address], Phone [phone]  [place] Office: [address], [address], [place] [address] Phone [phone]    [facility]: [address] [place] [address], Phone [phone]  [place] Office: [address] [place] [address], Phone [phone]    Patient Name: ANT ALVARADO    Age: 56 year, : 1965  Patient ID: -, MRN #: -, Gooden: [id number] A  Referring Physician: GAGE STEWART    Study Time/Date: 8AM on 2021  	  End Time/Date: 3:00PM on 2021		   Duration: 7H   Study Information:    EEG Recording Technique:  The patient underwent continuous Video-EEG monitoring, using Telemetry System hardware on the XLTek Digital System. EEG and video data were stored on a computer hard drive with important events saved in digital archive files. The material was reviewed by a physician (electroencephalographer / epileptologist) on a daily basis. Shadi and seizure detection algorithms were utilized and reviewed. An EEG Technician attended to the patient, and was available throughout daytime work hours.  The epilepsy center neurologist was available in person or on call 24-hours per day.    EEG Placement and Labeling of Electrodes:  The EEG was performed utilizing 20 channel referential EEG connections (coronal over temporal over parasagittal montage) using all standard 10-20 electrode placements with EKG, with additional electrodes placed in the inferior temporal region using the modified 10-10 montage electrode placements for elective admissions, or if deemed necessary. Recording was at a sampling rate of 256 samples per second per channel. Time synchronized digital video recording was done simultaneously with EEG recording. A low light infrared camera was used for low light recording.     History:   VEEG AT BEDSIDE 443 A  56 YEAR OLD MALE   COR: AWAKE / DROWSY  P/W:SYNCOPE AND COLLAPSE  PMH: SZ, BRAIN TUMOR  HV:        NOT COMPLETED DUE TO COVID  PHOTIC:NOT COMPLETED  A&OX4  CONCERN FOR SEIZURE            Pertinent Medication  Decadron  Keppra (Levetiracetam)  Protonix  VALTREX    Interpretation:    Daily EEG Visual Analysis  Findings: The background was continuous, spontaneously variable and reactive. During wakefulness, the posterior dominant rhythm consisted of well-modulated 9 Hz activity, with amplitude to 30 uV, that attenuated to eye opening.     Background Slowing:  No generalized background slowing was present.    Focal Slowing:   Intermittent theta/delta slowing in the right temporal-parietal region.    Sleep Background:  Drowsiness was characterized by fragmentation, attenuation, and slowing of the background activity.    Sleep was characterized by the presence of vertex waves, symmetric sleep spindles and K-complexes.    Other Non-Epileptiform Findings:  None were present.    Interictal Epileptiform Activity:   None were present.    Events:  Clinical events: None recorded.  Seizures: None recorded.    Activation Procedures:   Hyperventilation was not performed.    Photic stimulation was not performed.     Artifacts:  Intermittent myogenic and movement artifacts were noted.    ECG:  The heart rate on single channel ECG was predominantly between 60-70 BPM.    EEG Summary / Classification:  Abnormal EEG in the awake, drowsy and asleep states.  - Intermittent right temporal-parietal slowing    EEG Impression / Clinical Correlate:  Abnormal EEG study.  Structural or functional abnormality in the right temporal-parietal reigon.  No epileptiform pattern or seizure seen.    Yobani Todd MD  Attending Physician, [facility] Epilepsy Center
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
## 2021-06-28 NOTE — PROGRESS NOTE ADULT - SUBJECTIVE AND OBJECTIVE BOX
[facility] Division of Hospital Medicine  Chris Swanson MD  Pager ([place], 9G-5P): [phone]  Other Times:  [id number]    Patient is a 56y old  Male who presents with a chief complaint of Seizure and R brain lesion (27 Jun 2021 10:08)    SUBJECTIVE / OVERNIGHT EVENTS:  Patient able to communicate by Kazakh, which I speak.  Offers no new complaints of seizure, change in mental status, vision, headaches.  Has been under significant stress recently because of work and sleep schedule disruptions.  Developed Shingles last two weeks but since has crusted up.  Had episode of seizure 3 months(?) ago but didn't get a follow up.  Had what sounds like an external beam radiation tx in [place] for brain lesion 20 years ago.  No F/C, N/V, CP, SOB, Cough, lightheadedness, dizziness, abdominal pain, diarrhea, dysuria.    MEDICATIONS  (STANDING):  dexAMETHasone  Injectable 4 milliGRAM(s) IV Push every 6 hours  dextrose 40% Gel 15 Gram(s) Oral once  dextrose 5%. 1000 milliLiter(s) (50 mL/Hr) IV Continuous <Continuous>  dextrose 5%. 1000 milliLiter(s) (100 mL/Hr) IV Continuous <Continuous>  dextrose 50% Injectable 25 Gram(s) IV Push once  dextrose 50% Injectable 12.5 Gram(s) IV Push once  dextrose 50% Injectable 25 Gram(s) IV Push once  glucagon  Injectable 1 milliGRAM(s) IntraMuscular once  insulin lispro (ADMELOG) corrective regimen sliding scale   SubCutaneous three times a day before meals  insulin lispro (ADMELOG) corrective regimen sliding scale   SubCutaneous at bedtime  levETIRAcetam  IVPB 500 milliGRAM(s) IV Intermittent every 12 hours  pantoprazole    Tablet 40 milliGRAM(s) Oral before breakfast  valACYclovir 1000 milliGRAM(s) Oral three times a day    MEDICATIONS  (PRN):      Vital Signs Last 24 Hrs  T(C): 36.3 (28 Jun 2021 06:10), Max: 36.6 (27 Jun 2021 21:20)  T(F): 97.4 (28 Jun 2021 06:10), Max: 97.9 (27 Jun 2021 21:20)  HR: 62 (28 Jun 2021 06:10) (62 - 63)  BP: 104/64 (28 Jun 2021 06:10) (104/64 - 113/83)  BP(mean): --  RR: 17 (28 Jun 2021 06:10) (17 - 18)  SpO2: 95% (28 Jun 2021 06:10) (95% - 95%)  CAPILLARY BLOOD GLUCOSE      POCT Blood Glucose.: 171 mg/dL (28 Jun 2021 12:36)  POCT Blood Glucose.: 128 mg/dL (28 Jun 2021 08:34)  POCT Blood Glucose.: 148 mg/dL (27 Jun 2021 21:11)  POCT Blood Glucose.: 144 mg/dL (27 Jun 2021 18:13)    I&O's Summary      PHYSICAL EXAM:  GENERAL: NAD  HEAD:  Atraumatic, Normocephalic  EYES: EOMI, PERRLA, conjunctiva and sclera clear  NECK: Supple, No JVD  CHEST/LUNG: Clear to auscultation bilaterally; No wheeze  HEART: Regular rate and rhythm; No murmurs, rubs, or gallops  ABDOMEN: Soft, Nontender, Nondistended; Bowel sounds present  EXTREMITIES:  2+ Peripheral Pulses, No clubbing, cyanosis, or edema  PSYCH: Calm  NEUROLOGY: A/Ox3, non-focal  SKIN: No rashes or lesions    LABS:                        14.1   16.01 )-----------( 245      ( 28 Jun 2021 06:49 )             41.9     06-28    138  |  104  |  24<H>  ----------------------------<  138<H>  4.1   |  21<L>  |  0.79    Ca    9.0      28 Jun 2021 06:49  Phos  4.1     06-28  Mg     2.1     06-28                RADIOLOGY & ADDITIONAL TESTS:    Imaging Personally Reviewed:    Care Discussed with Consultants/Other Providers:

## 2021-06-28 NOTE — EEG REPORT - NS EEG TEXT BOX
[facility]  Comprehensive Epilepsy Center  Report of Continuous Video EEG    [facility]: [address], [place] [address], Phone [phone]  [facility]: [address] [place], [place] [address] [address], Phone [phone]  [place] Office: [address], [address], [place] [address] Phone [phone]    [facility]: [address] [place] [address], Phone [phone]  [place] Office: [address] [place] [address], Phone [phone]    Patient Name: ANT ALVARADO    Age: 56 year, : 1965  Patient ID: -, MRN #: -, Gooden: [id number] A  Referring Physician: GAGE STEWART  EEG #: [id number]    Study Time/Date: 11:34:33 AM on 2021  	  End Time/Date: 08:00 on 2021		   Duration: 20H 25min    Study Information:    EEG Recording Technique:  The patient underwent continuous Video-EEG monitoring, using Telemetry System hardware on the XLTek Digital System. EEG and video data were stored on a computer hard drive with important events saved in digital archive files. The material was reviewed by a physician (electroencephalographer / epileptologist) on a daily basis. Shadi and seizure detection algorithms were utilized and reviewed. An EEG Technician attended to the patient, and was available throughout daytime work hours.  The epilepsy center neurologist was available in person or on call 24-hours per day.    EEG Placement and Labeling of Electrodes:  The EEG was performed utilizing 20 channel referential EEG connections (coronal over temporal over parasagittal montage) using all standard 10-20 electrode placements with EKG, with additional electrodes placed in the inferior temporal region using the modified 10-10 montage electrode placements for elective admissions, or if deemed necessary. Recording was at a sampling rate of 256 samples per second per channel. Time synchronized digital video recording was done simultaneously with EEG recording. A low light infrared camera was used for low light recording.     History:   VEEG AT BEDSIDE 443 A  56 YEAR OLD MALE   COR: AWAKE / DROWSY  P/W:SYNCOPE AND COLLAPSE  PMH: SZ, BRAIN TUMOR  HV:        NOT COMPLETED DUE TO COVID  PHOTIC:NOT COMPLETED  A&OX4  CONCERN FOR SEIZURE            Pertinent Medication  Decadron  Keppra (Levetiracetam)  Protonix  VALTREX    Interpretation:    Daily EEG Visual Analysis  Findings: The background was continuous, spontaneously variable and reactive. During wakefulness, the posterior dominant rhythm consisted of well-modulated 9 Hz activity, with amplitude to 30 uV, that attenuated to eye opening.     Background Slowing:  No generalized background slowing was present.    Focal Slowing:   Intermittent theta/delta slowing in the right temporal-parietal region.    Sleep Background:  Drowsiness was characterized by fragmentation, attenuation, and slowing of the background activity.    Sleep was characterized by the presence of vertex waves, symmetric sleep spindles and K-complexes.    Other Non-Epileptiform Findings:  None were present.    Interictal Epileptiform Activity:   None were present.    Events:  Clinical events: None recorded.  Seizures: None recorded.    Activation Procedures:   Hyperventilation was not performed.    Photic stimulation was not performed.     Artifacts:  Intermittent myogenic and movement artifacts were noted.    ECG:  The heart rate on single channel ECG was predominantly between 60-70 BPM.    EEG Summary / Classification:  Abnormal EEG in the awake, drowsy and asleep states.  - Intermittent right temporal-parietal slowing    EEG Impression / Clinical Correlate:  Abnormal EEG study.  Structural or functional abnormality in the right temporal-parietal reigon.  No epileptiform pattern or seizure seen.    Yobani Todd MD  Attending Physician, [facility]ilepsy [facility]

## 2021-06-29 LAB
ANION GAP SERPL CALC-SCNC: 13 MMOL/L — SIGNIFICANT CHANGE UP (ref 7–14)
BUN SERPL-MCNC: 16 MG/DL — SIGNIFICANT CHANGE UP (ref 7–23)
CALCIUM SERPL-MCNC: 8.8 MG/DL — SIGNIFICANT CHANGE UP (ref 8.4–10.5)
CHLORIDE SERPL-SCNC: 104 MMOL/L — SIGNIFICANT CHANGE UP (ref 98–107)
CO2 SERPL-SCNC: 21 MMOL/L — LOW (ref 22–31)
CREAT SERPL-MCNC: 0.75 MG/DL — SIGNIFICANT CHANGE UP (ref 0.5–1.3)
GLUCOSE SERPL-MCNC: 127 MG/DL — HIGH (ref 70–99)
HCT VFR BLD CALC: 41.7 % — SIGNIFICANT CHANGE UP (ref 39–50)
HGB BLD-MCNC: 14.1 G/DL — SIGNIFICANT CHANGE UP (ref 13–17)
MAGNESIUM SERPL-MCNC: 2 MG/DL — SIGNIFICANT CHANGE UP (ref 1.6–2.6)
MCHC RBC-ENTMCNC: 31.5 PG — SIGNIFICANT CHANGE UP (ref 27–34)
MCHC RBC-ENTMCNC: 33.8 GM/DL — SIGNIFICANT CHANGE UP (ref 32–36)
MCV RBC AUTO: 93.1 FL — SIGNIFICANT CHANGE UP (ref 80–100)
NRBC # BLD: 0 /100 WBCS — SIGNIFICANT CHANGE UP
NRBC # FLD: 0 K/UL — SIGNIFICANT CHANGE UP
PHOSPHATE SERPL-MCNC: 3.5 MG/DL — SIGNIFICANT CHANGE UP (ref 2.5–4.5)
PLATELET # BLD AUTO: 253 K/UL — SIGNIFICANT CHANGE UP (ref 150–400)
POTASSIUM SERPL-MCNC: 4.4 MMOL/L — SIGNIFICANT CHANGE UP (ref 3.5–5.3)
POTASSIUM SERPL-SCNC: 4.4 MMOL/L — SIGNIFICANT CHANGE UP (ref 3.5–5.3)
RBC # BLD: 4.48 M/UL — SIGNIFICANT CHANGE UP (ref 4.2–5.8)
RBC # FLD: 11.9 % — SIGNIFICANT CHANGE UP (ref 10.3–14.5)
SODIUM SERPL-SCNC: 138 MMOL/L — SIGNIFICANT CHANGE UP (ref 135–145)
WBC # BLD: 14.46 K/UL — HIGH (ref 3.8–10.5)
WBC # FLD AUTO: 14.46 K/UL — HIGH (ref 3.8–10.5)

## 2021-06-29 PROCEDURE — 71260 CT THORAX DX C+: CPT | Mod: 26

## 2021-06-29 PROCEDURE — 99233 SBSQ HOSP IP/OBS HIGH 50: CPT

## 2021-06-29 PROCEDURE — 74177 CT ABD & PELVIS W/CONTRAST: CPT | Mod: 26

## 2021-06-29 PROCEDURE — 99233 SBSQ HOSP IP/OBS HIGH 50: CPT | Mod: GC

## 2021-06-29 RX ADMIN — Medication 4 MILLIGRAM(S): at 17:35

## 2021-06-29 RX ADMIN — LEVETIRACETAM 400 MILLIGRAM(S): 250 TABLET, FILM COATED ORAL at 06:02

## 2021-06-29 RX ADMIN — VALACYCLOVIR 1000 MILLIGRAM(S): 500 TABLET, FILM COATED ORAL at 06:03

## 2021-06-29 RX ADMIN — Medication 4 MILLIGRAM(S): at 13:04

## 2021-06-29 RX ADMIN — LEVETIRACETAM 400 MILLIGRAM(S): 250 TABLET, FILM COATED ORAL at 17:35

## 2021-06-29 RX ADMIN — Medication 4 MILLIGRAM(S): at 06:02

## 2021-06-29 RX ADMIN — PANTOPRAZOLE SODIUM 40 MILLIGRAM(S): 20 TABLET, DELAYED RELEASE ORAL at 06:02

## 2021-06-29 RX ADMIN — VALACYCLOVIR 1000 MILLIGRAM(S): 500 TABLET, FILM COATED ORAL at 22:41

## 2021-06-29 RX ADMIN — Medication 1: at 18:02

## 2021-06-29 RX ADMIN — VALACYCLOVIR 1000 MILLIGRAM(S): 500 TABLET, FILM COATED ORAL at 13:04
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
## 2021-06-29 NOTE — PROGRESS NOTE ADULT - ATTENDING COMMENTS
Patient seen and examined with neurology team and above note reviewed in detail and I agree with assessment and plan as outlined. Hx as noted and has hx of right sided brain tumor diagnosed in [place].   He was admitted her for suspicion of seizure.   He is doing and feeling well and no new neurologic symptoms and his neurologic exam is normal.    Imaging and EEG reviewed     Plan: Likely seizure in setting of abnormal lesion on right side of brain    1. Continue keppra as outlined and seizure precautions and NO driving for 6 months     2. Follow up with Neuro oncology and all information provided     Continue medical mgt and supportive care and all questions answered and patient understood plan.

## 2021-06-29 NOTE — PROGRESS NOTE ADULT - SUBJECTIVE AND OBJECTIVE BOX
[facility] Division of Hospital Medicine  Chris Swanson MD  Pager (ANATOLY-F, 8A-5P): [phone]  Other Times:  [phone]    Patient is a 56y old  Male who presents with a chief complaint of Seizure and R brain lesion (28 Jun 2021 14:04)    SUBJECTIVE / OVERNIGHT EVENTS:  Patient still has not gotten his CT done.  As per Radiology, no nurse answered the phone for transport, but nurses state that radiology did not call them.  No F/C, N/V, CP, SOB, Cough, lightheadedness, dizziness, abdominal pain, diarrhea, dysuria.    MEDICATIONS  (STANDING):  dexAMETHasone  Injectable 4 milliGRAM(s) IV Push every 6 hours  dextrose 40% Gel 15 Gram(s) Oral once  dextrose 5%. 1000 milliLiter(s) (50 mL/Hr) IV Continuous <Continuous>  dextrose 5%. 1000 milliLiter(s) (100 mL/Hr) IV Continuous <Continuous>  dextrose 50% Injectable 25 Gram(s) IV Push once  dextrose 50% Injectable 12.5 Gram(s) IV Push once  dextrose 50% Injectable 25 Gram(s) IV Push once  glucagon  Injectable 1 milliGRAM(s) IntraMuscular once  insulin lispro (ADMELOG) corrective regimen sliding scale   SubCutaneous at bedtime  insulin lispro (ADMELOG) corrective regimen sliding scale   SubCutaneous three times a day before meals  levETIRAcetam  IVPB 500 milliGRAM(s) IV Intermittent every 12 hours  pantoprazole    Tablet 40 milliGRAM(s) Oral before breakfast  valACYclovir 1000 milliGRAM(s) Oral three times a day    MEDICATIONS  (PRN):      Vital Signs Last 24 Hrs  T(C): 36.7 (29 Jun 2021 06:00), Max: 36.7 (28 Jun 2021 14:59)  T(F): 98 (29 Jun 2021 06:00), Max: 98 (28 Jun 2021 14:59)  HR: 66 (29 Jun 2021 06:00) (65 - 71)  BP: 108/62 (29 Jun 2021 06:00) (108/59 - 117/70)  BP(mean): --  RR: 18 (29 Jun 2021 06:00) (18 - 18)  SpO2: 95% (29 Jun 2021 06:00) (95% - 95%)  CAPILLARY BLOOD GLUCOSE      POCT Blood Glucose.: 110 mg/dL (29 Jun 2021 09:04)  POCT Blood Glucose.: 191 mg/dL (28 Jun 2021 21:26)  POCT Blood Glucose.: 238 mg/dL (28 Jun 2021 17:50)  POCT Blood Glucose.: 171 mg/dL (28 Jun 2021 12:36)    I&O's Summary      PHYSICAL EXAM:  GENERAL: NAD  HEAD:  Atraumatic, Normocephalic  EYES: EOMI, PERRLA, conjunctiva and sclera clear  NECK: Supple, No JVD  CHEST/LUNG: Clear to auscultation bilaterally; No wheeze  HEART: Regular rate and rhythm; No murmurs, rubs, or gallops  ABDOMEN: Soft, Nontender, Nondistended; Bowel sounds present  EXTREMITIES:  2+ Peripheral Pulses, No clubbing, cyanosis, or edema  PSYCH: Calm  NEUROLOGY: A/Ox3, non-focal  SKIN: No rashes or lesions    LABS:                        14.1   14.46 )-----------( 253      ( 29 Jun 2021 04:34 )             41.7     06-29    138  |  104  |  16  ----------------------------<  127<H>  4.4   |  21<L>  |  0.75    Ca    8.8      29 Jun 2021 04:34  Phos  3.5     06-29  Mg     2.00     06-29                RADIOLOGY & ADDITIONAL TESTS:    Imaging Personally Reviewed:    Care Discussed with Consultants/Other Providers:
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
## 2021-06-30 ENCOUNTER — TRANSCRIPTION ENCOUNTER (OUTPATIENT)
Age: 56
End: 2021-06-30

## 2021-06-30 LAB
ANION GAP SERPL CALC-SCNC: 13 MMOL/L — SIGNIFICANT CHANGE UP (ref 7–14)
BUN SERPL-MCNC: 20 MG/DL — SIGNIFICANT CHANGE UP (ref 7–23)
CALCIUM SERPL-MCNC: 8.9 MG/DL — SIGNIFICANT CHANGE UP (ref 8.4–10.5)
CHLORIDE SERPL-SCNC: 104 MMOL/L — SIGNIFICANT CHANGE UP (ref 98–107)
CO2 SERPL-SCNC: 20 MMOL/L — LOW (ref 22–31)
CREAT SERPL-MCNC: 0.73 MG/DL — SIGNIFICANT CHANGE UP (ref 0.5–1.3)
GLUCOSE SERPL-MCNC: 137 MG/DL — HIGH (ref 70–99)
HCT VFR BLD CALC: 42.8 % — SIGNIFICANT CHANGE UP (ref 39–50)
HGB BLD-MCNC: 14.2 G/DL — SIGNIFICANT CHANGE UP (ref 13–17)
MAGNESIUM SERPL-MCNC: 2.2 MG/DL — SIGNIFICANT CHANGE UP (ref 1.6–2.6)
MCHC RBC-ENTMCNC: 31.1 PG — SIGNIFICANT CHANGE UP (ref 27–34)
MCHC RBC-ENTMCNC: 33.2 GM/DL — SIGNIFICANT CHANGE UP (ref 32–36)
MCV RBC AUTO: 93.7 FL — SIGNIFICANT CHANGE UP (ref 80–100)
NRBC # BLD: 0 /100 WBCS — SIGNIFICANT CHANGE UP
NRBC # FLD: 0 K/UL — SIGNIFICANT CHANGE UP
PHOSPHATE SERPL-MCNC: 3.6 MG/DL — SIGNIFICANT CHANGE UP (ref 2.5–4.5)
PLATELET # BLD AUTO: 278 K/UL — SIGNIFICANT CHANGE UP (ref 150–400)
POTASSIUM SERPL-MCNC: 4.4 MMOL/L — SIGNIFICANT CHANGE UP (ref 3.5–5.3)
POTASSIUM SERPL-SCNC: 4.4 MMOL/L — SIGNIFICANT CHANGE UP (ref 3.5–5.3)
RBC # BLD: 4.57 M/UL — SIGNIFICANT CHANGE UP (ref 4.2–5.8)
RBC # FLD: 11.8 % — SIGNIFICANT CHANGE UP (ref 10.3–14.5)
SODIUM SERPL-SCNC: 137 MMOL/L — SIGNIFICANT CHANGE UP (ref 135–145)
WBC # BLD: 13.69 K/UL — HIGH (ref 3.8–10.5)
WBC # FLD AUTO: 13.69 K/UL — HIGH (ref 3.8–10.5)

## 2021-06-30 PROCEDURE — 99233 SBSQ HOSP IP/OBS HIGH 50: CPT

## 2021-06-30 RX ADMIN — Medication 4 MILLIGRAM(S): at 18:15

## 2021-06-30 RX ADMIN — Medication 1: at 09:57

## 2021-06-30 RX ADMIN — LEVETIRACETAM 400 MILLIGRAM(S): 250 TABLET, FILM COATED ORAL at 18:15

## 2021-06-30 RX ADMIN — PANTOPRAZOLE SODIUM 40 MILLIGRAM(S): 20 TABLET, DELAYED RELEASE ORAL at 06:50

## 2021-06-30 RX ADMIN — LEVETIRACETAM 400 MILLIGRAM(S): 250 TABLET, FILM COATED ORAL at 06:49

## 2021-06-30 RX ADMIN — Medication 4 MILLIGRAM(S): at 06:49

## 2021-06-30 RX ADMIN — VALACYCLOVIR 1000 MILLIGRAM(S): 500 TABLET, FILM COATED ORAL at 12:52

## 2021-06-30 RX ADMIN — Medication 4 MILLIGRAM(S): at 12:52

## 2021-06-30 RX ADMIN — VALACYCLOVIR 1000 MILLIGRAM(S): 500 TABLET, FILM COATED ORAL at 06:50

## 2021-06-30 RX ADMIN — Medication 4 MILLIGRAM(S): at 23:11

## 2021-06-30 RX ADMIN — Medication 4 MILLIGRAM(S): at 00:06

## 2021-06-30 NOTE — DISCHARGE NOTE PROVIDER - PROVIDER TOKENS
PROVIDER:[TOKEN:[[id number]]],PROVIDER:[TOKEN:[[id number]]] PROVIDER:[TOKEN:[[id number]]],PROVIDER:[TOKEN:[[id number]]],PROVIDER:[TOKEN:[[id number]]]
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
## 2021-06-30 NOTE — DISCHARGE NOTE PROVIDER - CARE PROVIDER_API CALL
Kelechi Espinoza)  Clinical Neurophysiology; Neurology  [address] [facility], [address]  [place] [address]  Phone: [phone]  Fax: [phone]  Follow Up Time:     Odilon Solis)  Neurology  [facility], [address] [place] [address]  Phone: [phone]  Fax: [phone]  Follow Up Time:    Kelechi Espinoza)  Clinical Neurophysiology; Neurology  [address] [facility], [facility] [address]  [place] [address]  Phone: [phone]  Fax: [phone]  Follow Up Time:     Odilon Solis)  Neurology  [facility], [address] [place] [address]  Phone: [phone]  Fax: [phone]  Follow Up Time:     Petr Loyd)  Neurosurgery  General  [address] [facility], [facility] [address]  [place] [address]  Phone: [phone]  Fax: [phone]  Follow Up Time:

## 2021-06-30 NOTE — DISCHARGE NOTE PROVIDER - NSFOLLOWUPCLINICS_GEN_ALL_ED_FT
[facility] Internal Medicine  General Internal Medicine  [address] [place] [address]  Phone: [phone]  Fax:
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
## 2021-06-30 NOTE — PROGRESS NOTE ADULT - SUBJECTIVE AND OBJECTIVE BOX
[facility] Division of Hospital Medicine  Chris Swanson MD  Pager (M-F, 8A-5P): [phone]  Other Times:  [phone]    Patient is a 56y old  Male who presents with a chief complaint of Seizure and R brain lesion (29 Jun 2021 14:16)    SUBJECTIVE / OVERNIGHT EVENTS:  Patient offers no new complaints.  No F/C, N/V, CP, SOB, Cough, lightheadedness, dizziness, abdominal pain, diarrhea, dysuria.    MEDICATIONS  (STANDING):  dexAMETHasone  Injectable 4 milliGRAM(s) IV Push every 6 hours  dextrose 40% Gel 15 Gram(s) Oral once  dextrose 5%. 1000 milliLiter(s) (50 mL/Hr) IV Continuous <Continuous>  dextrose 5%. 1000 milliLiter(s) (100 mL/Hr) IV Continuous <Continuous>  dextrose 50% Injectable 25 Gram(s) IV Push once  dextrose 50% Injectable 12.5 Gram(s) IV Push once  dextrose 50% Injectable 25 Gram(s) IV Push once  glucagon  Injectable 1 milliGRAM(s) IntraMuscular once  insulin lispro (ADMELOG) corrective regimen sliding scale   SubCutaneous three times a day before meals  insulin lispro (ADMELOG) corrective regimen sliding scale   SubCutaneous at bedtime  levETIRAcetam  IVPB 500 milliGRAM(s) IV Intermittent every 12 hours  pantoprazole    Tablet 40 milliGRAM(s) Oral before breakfast  valACYclovir 1000 milliGRAM(s) Oral three times a day    MEDICATIONS  (PRN):      Vital Signs Last 24 Hrs  T(C): 36.3 (30 Jun 2021 10:04), Max: 36.7 (29 Jun 2021 22:36)  T(F): 97.3 (30 Jun 2021 10:04), Max: 98 (29 Jun 2021 22:36)  HR: 61 (30 Jun 2021 10:04) (50 - 78)  BP: 130/81 (30 Jun 2021 10:04) (98/58 - 130/81)  BP(mean): --  RR: 16 (30 Jun 2021 10:04) (16 - 19)  SpO2: 96% (30 Jun 2021 10:04) (95% - 96%)  CAPILLARY BLOOD GLUCOSE      POCT Blood Glucose.: 161 mg/dL (30 Jun 2021 09:29)  POCT Blood Glucose.: 188 mg/dL (29 Jun 2021 21:36)  POCT Blood Glucose.: 181 mg/dL (29 Jun 2021 17:49)  POCT Blood Glucose.: 111 mg/dL (29 Jun 2021 12:46)    I&O's Summary      PHYSICAL EXAM:  GENERAL: NAD  HEAD:  Atraumatic, Normocephalic  EYES: EOMI, PERRLA, conjunctiva and sclera clear  NECK: Supple, No JVD  CHEST/LUNG: Clear to auscultation bilaterally; No wheeze  HEART: Regular rate and rhythm; No murmurs, rubs, or gallops  ABDOMEN: Soft, Nontender, Nondistended; Bowel sounds present  EXTREMITIES:  2+ Peripheral Pulses, No clubbing, cyanosis, or edema  PSYCH: Calm  NEUROLOGY: A/Ox3, non-focal  SKIN: No rashes or lesions    LABS:                        14.2   13.69 )-----------( 278      ( 30 Jun 2021 06:27 )             42.8     06-30    137  |  104  |  20  ----------------------------<  137<H>  4.4   |  20<L>  |  0.73    Ca    8.9      30 Jun 2021 06:27  Phos  3.6     06-30  Mg     2.20     06-30                RADIOLOGY & ADDITIONAL TESTS:    Imaging Personally Reviewed:    Care Discussed with Consultants/Other Providers:

## 2021-06-30 NOTE — CHART NOTE - NSCHARTNOTEFT_GEN_A_CORE
56M with with r. parieto temporal lesion    Imaging and case reviewed with attending neurosurgeon.  No acute neurosurgical intervention at this time.  Care per primary team.  Please reconsult as needed.  seizure ppx per neurology   can f/u with Dr. Loyd upon dc in 2 weeks.    d/w attending

## 2021-06-30 NOTE — DISCHARGE NOTE PROVIDER - NSDCMRMEDTOKEN_GEN_ALL_CORE_FT
[employer] OTC supplements:    dexamethasone 4 mg oral tablet: 1 tab(s) orally every 6 hours  levETIRAcetam 500 mg oral tablet: 1 tab(s) orally 2 times a day  pantoprazole 40 mg oral delayed release tablet: 1 tab(s) orally once a day (before a meal)

## 2021-06-30 NOTE — DISCHARGE NOTE PROVIDER - NSDCFUADDAPPT_GEN_ALL_CORE_FT
Follow-up with neuro-oncology Dr. Solis in 3-4 weeks. ([phone]) Follow up with Dr. Espinoza   Follow up with your PCP and/or Medicine clinic. Follow-up with neuro-oncology Dr. Solis in 3-4 weeks. ([phone]) Follow up with Dr. Espinoza   Follow up with your PCP and/or Medicine clinic.  Follow up with Dr. Petr Loyd 2 weeks after discharge [phone]

## 2021-06-30 NOTE — DISCHARGE NOTE PROVIDER - CARE PROVIDERS DIRECT ADDRESSES
[email or web address]
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
## 2021-07-01 ENCOUNTER — TRANSCRIPTION ENCOUNTER (OUTPATIENT)
Age: 56
End: 2021-07-01

## 2021-07-01 VITALS
HEART RATE: 66 BPM | RESPIRATION RATE: 16 BRPM | DIASTOLIC BLOOD PRESSURE: 72 MMHG | SYSTOLIC BLOOD PRESSURE: 119 MMHG | OXYGEN SATURATION: 98 % | TEMPERATURE: 97 F

## 2021-07-01 LAB
ANION GAP SERPL CALC-SCNC: 13 MMOL/L — SIGNIFICANT CHANGE UP (ref 7–14)
BUN SERPL-MCNC: 22 MG/DL — SIGNIFICANT CHANGE UP (ref 7–23)
CALCIUM SERPL-MCNC: 9.1 MG/DL — SIGNIFICANT CHANGE UP (ref 8.4–10.5)
CHLORIDE SERPL-SCNC: 101 MMOL/L — SIGNIFICANT CHANGE UP (ref 98–107)
CO2 SERPL-SCNC: 23 MMOL/L — SIGNIFICANT CHANGE UP (ref 22–31)
CREAT SERPL-MCNC: 0.73 MG/DL — SIGNIFICANT CHANGE UP (ref 0.5–1.3)
GLUCOSE SERPL-MCNC: 134 MG/DL — HIGH (ref 70–99)
HCT VFR BLD CALC: 43.1 % — SIGNIFICANT CHANGE UP (ref 39–50)
HGB BLD-MCNC: 14.6 G/DL — SIGNIFICANT CHANGE UP (ref 13–17)
MAGNESIUM SERPL-MCNC: 2.3 MG/DL — SIGNIFICANT CHANGE UP (ref 1.6–2.6)
MCHC RBC-ENTMCNC: 31.1 PG — SIGNIFICANT CHANGE UP (ref 27–34)
MCHC RBC-ENTMCNC: 33.9 GM/DL — SIGNIFICANT CHANGE UP (ref 32–36)
MCV RBC AUTO: 91.9 FL — SIGNIFICANT CHANGE UP (ref 80–100)
NRBC # BLD: 0 /100 WBCS — SIGNIFICANT CHANGE UP
NRBC # FLD: 0 K/UL — SIGNIFICANT CHANGE UP
PHOSPHATE SERPL-MCNC: 4.3 MG/DL — SIGNIFICANT CHANGE UP (ref 2.5–4.5)
PLATELET # BLD AUTO: 287 K/UL — SIGNIFICANT CHANGE UP (ref 150–400)
POTASSIUM SERPL-MCNC: 4.5 MMOL/L — SIGNIFICANT CHANGE UP (ref 3.5–5.3)
POTASSIUM SERPL-SCNC: 4.5 MMOL/L — SIGNIFICANT CHANGE UP (ref 3.5–5.3)
RBC # BLD: 4.69 M/UL — SIGNIFICANT CHANGE UP (ref 4.2–5.8)
RBC # FLD: 11.8 % — SIGNIFICANT CHANGE UP (ref 10.3–14.5)
SODIUM SERPL-SCNC: 137 MMOL/L — SIGNIFICANT CHANGE UP (ref 135–145)
WBC # BLD: 15.66 K/UL — HIGH (ref 3.8–10.5)
WBC # FLD AUTO: 15.66 K/UL — HIGH (ref 3.8–10.5)

## 2021-07-01 PROCEDURE — 99239 HOSP IP/OBS DSCHRG MGMT >30: CPT

## 2021-07-01 RX ORDER — LEVETIRACETAM 250 MG/1
500 TABLET, FILM COATED ORAL
Refills: 0 | Status: DISCONTINUED | OUTPATIENT
Start: 2021-07-01 | End: 2021-07-01

## 2021-07-01 RX ORDER — DEXAMETHASONE 0.5 MG/5ML
4 ELIXIR ORAL EVERY 6 HOURS
Refills: 0 | Status: DISCONTINUED | OUTPATIENT
Start: 2021-07-01 | End: 2021-07-01

## 2021-07-01 RX ORDER — DEXAMETHASONE 0.5 MG/5ML
1 ELIXIR ORAL
Qty: 120 | Refills: 0
Start: 2021-07-01 | End: 2021-07-30

## 2021-07-01 RX ORDER — LEVETIRACETAM 250 MG/1
1 TABLET, FILM COATED ORAL
Qty: 60 | Refills: 0
Start: 2021-07-01 | End: 2021-07-30

## 2021-07-01 RX ORDER — PANTOPRAZOLE SODIUM 20 MG/1
1 TABLET, DELAYED RELEASE ORAL
Qty: 30 | Refills: 0
Start: 2021-07-01 | End: 2021-07-30

## 2021-07-01 RX ADMIN — Medication 4 MILLIGRAM(S): at 06:11

## 2021-07-01 RX ADMIN — Medication 4 MILLIGRAM(S): at 12:19

## 2021-07-01 RX ADMIN — LEVETIRACETAM 400 MILLIGRAM(S): 250 TABLET, FILM COATED ORAL at 06:10

## 2021-07-01 RX ADMIN — PANTOPRAZOLE SODIUM 40 MILLIGRAM(S): 20 TABLET, DELAYED RELEASE ORAL at 06:11

## 2021-07-01 NOTE — DISCHARGE NOTE NURSING/CASE MANAGEMENT/SOCIAL WORK - NSDCFUADDAPPT_GEN_ALL_CORE_FT
Follow-up with neuro-oncology Dr. Solis in 3-4 weeks. ([phone]) Follow up with Dr. Espinoza   Follow up with your PCP and/or Medicine clinic.  Follow up with Dr. Petr Loyd 2 weeks after discharge [phone]

## 2021-07-01 NOTE — PROGRESS NOTE ADULT - PROBLEM SELECTOR PLAN 5
- DVT ppx: Hold pharmacologic ppx for brain mass.  - Diet: Regular, pt reports no dysphagia or odynophagia
- DVT ppx: Hold pharmacologic ppx for now pending MRI/MRA results  - Diet: Regular, pt reports no dysphagia or odynophagia
- DVT ppx: Hold pharmacologic ppx for brain mass.  - Diet: Regular, pt reports no dysphagia or odynophagia
Serum Ca 7.9 on admission. No corresponding albumin.  -repeat normal at 9.1  - EKG with no acute changes

## 2021-07-01 NOTE — PROGRESS NOTE ADULT - NSPROGADDITIONALINFOA_GEN_ALL_CORE
Discussed with cinthya Hu on 6/28 for 25 minutes.  Answered all the questions.
Patient medically optimized for discharge. Discharge planning 40 minutes - discussed with patient and consultants.
Discussed with cinthya Hu on 6/28 for 25 minutes.  Answered all the questions.
Discussed with cinthya Hu on 6/28 for 25 minutes.  Answered all the questions.  Patient medically optimized for discharge once Neurosurgery clears the patient.  Discharge planning 40 minutes - discussed with patient and consultants.
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
## 2021-07-01 NOTE — DISCHARGE NOTE NURSING/CASE MANAGEMENT/SOCIAL WORK - PATIENT PORTAL LINK FT
You can access the FollowMyHealth Patient Portal offered by [facility] by registering at the following website: [email or web address]. By joining [employer]’s FollowMyHealth portal, you will also be able to view your health information using other applications (apps) compatible with our system.

## 2021-07-01 NOTE — PROGRESS NOTE ADULT - REASON FOR ADMISSION
Seizure and R brain lesion

## 2021-07-01 NOTE — PROGRESS NOTE ADULT - PROBLEM SELECTOR PLAN 4
Nonpruritic. Appears to consist of clusters of crusted over blisters. Localized to single dermatome.   - Despite being >72 hrs since onset of rash and no fresh blisters on exam, will start Valacyclovir 1000 mg tid given that pt is now on steroids  - Contact precautions
Nonpruritic. Appears to consist of clusters of crusted over blisters. Localized to single dermatome.   - Despite being >72 hrs since onset of rash and no fresh blisters on exam, will start Valacyclovir 1000 mg tid given that pt is now on steroids  - Contact precautions  - appears to be crusting over.  Will continue to monitor.
Nonpruritic. Appears to consist of clusters of crusted over blisters. Localized to single dermatome.   - Despite being >72 hrs since onset of rash and no fresh blisters on exam, will start Valacyclovir 1000 mg tid given that pt is now on steroids  - Contact precautions  - appears to be crusting over.  Will continue to monitor.
Nonpruritic. Appears to consist of clusters of crusted over blisters. Localized to single dermatome.   - Despite being >72 hrs since onset of rash and no fresh blisters on exam, will start Valacyclovir 1000 mg tid given that pt is now on steroids  - Contact precautions  - appears to be crusting over.  Will continue to monitor.  d/c valtrex.
Nonpruritic. Appears to consist of clusters of crusted over blisters. Localized to single dermatome.   - Despite being >72 hrs since onset of rash and no fresh blisters on exam, will start Valacyclovir 1000 mg tid given that pt is now on steroids  - Contact precautions
Nonpruritic. Appears to consist of clusters of crusted over blisters. Localized to single dermatome.   - Despite being >72 hrs since onset of rash and no fresh blisters on exam, will start Valacyclovir 1000 mg tid given that pt is now on steroids  - Contact precautions  - appears to be crusting over.  Will continue to monitor.  d/c valtrex.

## 2021-07-01 NOTE — PROGRESS NOTE ADULT - PROBLEM SELECTOR PROBLEM 5
Need for prophylactic measure
Need for prophylactic measure
Hypocalcemia
Need for prophylactic measure
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
## 2021-07-01 NOTE — PROGRESS NOTE ADULT - PROBLEM SELECTOR PLAN 1
Right parietotemporal lobe with mild surrounding vasogenic edema.  - Neurosurgery and Neurology consults obtained, appreciate recs  - EEG - no new seziures while on Keppra/decadron  - S/p IV Decadron 10 mg x1 in ED. C/w IV Decadron 4 mg q6hrs. Will start PO Protonix 40 mg daily and FS checks qAC and qhs while on steroids.   -Leukocytosis likely steroid driven  - S/p IV Keppra load of 1500 mg x1 in ED. C/w IV Keppra 500 mg bid.   - CT C/A/P with IV contrast ordered to rule out metastasis source - unnecessary delay in work up due to patient not getting his CT scan.  - Neuro checks per routine  - awaiting final neurosurgery recommendations prior to discharge.
Right parietotemporal lobe with mild surrounding vasogenic edema.  - Neurosurgery and Neurology consults obtained, appreciate recs  - Per neurosurgery, no acute neurosurgical intervention  - EEG - f/u result  - S/p IV Decadron 10 mg x1 in ED. C/w IV Decadron 4 mg q6hrs. Will start PO Protonix 40 mg daily and FS checks qAC and qhs while on steroids.   -Leukocytosis likely steroid driven  - S/p IV Keppra load of 1500 mg x1 in ED. C/w IV Keppra 500 mg bid.   - CT C/A/P with IV contrast ordered to rule out metastasis source - unnecessary delay in work up due to patient not getting his CT scan.  - Neuro checks per routine
Right parietotemporal lobe with mild surrounding vasogenic edema.  - Neurosurgery and Neurology consults obtained, appreciate recs  - Per neurosurgery, no acute neurosurgical intervention  - EEG - f/u result  - S/p IV Decadron 10 mg x1 in ED. C/w IV Decadron 4 mg q6hrs. Will start PO Protonix 40 mg daily and FS checks qAC and qhs while on steroids.   -Leukocytosis likely steroid driven  - S/p IV Keppra load of 1500 mg x1 in ED. C/w IV Keppra 500 mg bid.   - CT C/A/P with IV contrast ordered to rule out metastasis source.  - Neuro checks per routine
Right parietotemporal lobe with mild surrounding vasogenic edema.  - Neurosurgery and Neurology consults obtained, appreciate recs  - EEG - no new seziures while on Keppra/decadron  - S/p IV Decadron 10 mg x1 in ED. C/w IV Decadron 4 mg q6hrs. Will start PO Protonix 40 mg daily and FS checks qAC and qhs while on steroids.   -Leukocytosis likely steroid driven  - S/p IV Keppra load of 1500 mg x1 in ED. C/w IV Keppra 500 mg bid.   - CT C/A/P with IV contrast ordered to rule out metastasis source - unnecessary delay in work up due to patient not getting his CT scan.  - Neuro checks per routine  - follow up as outpatient with Dr. Loyd.
CTH noncontrast with 2.0 x 1.9 cm hyperattenuating lesion in the lateral right parietotemporal lobe with mild surrounding vasogenic edema.  - Neurosurgery and Neurology consults obtained, appreciate recs  - Per neurosurgery, no acute neurosurgical intervention  - MRI brain w/wo contrast, MRA head wo contrast, MRA neck w/wo contrast performed - await read  - EEG ordered  - S/p IV Decadron 10 mg x1 in ED. C/w IV Decadron 4 mg q6hrs. Will start PO Protonix 40 mg daily and FS checks qAC and qhs while on steroids.   -Leukocytosis likely steroid driven  - S/p IV Keppra load of 1500 mg x1 in ED. C/w IV Keppra 500 mg bid.   - CT C/A/P with IV contrast on hold now as family is concerned about medical bill. Will need to consult with MICHELLE re: coverage   - S&S eval  - Neuro checks per routine  - Aspiration precautions, fall risk protocol, and seizure precautions
CTH noncontrast with 2.0 x 1.9 cm hyperattenuating lesion in the lateral right parietotemporal lobe with mild surrounding vasogenic edema.  - Neurosurgery and Neurology consults obtained, appreciate recs  - Per neurosurgery, no acute neurosurgical intervention  - MRI brain w/wo contrast, MRA head wo contrast, MRA neck w/wo contrast ordered  - Spot EEG ordered  - S/p IV Decadron 10 mg x1 in ED. C/w IV Decadron 4 mg q6hrs. Will start PO Protonix 40 mg daily and FS checks qAC and qhs while on steroids.   - S/p IV Keppra load of 1500 mg x1 in ED. C/w IV Keppra 500 mg bid.   - CT C/A/P with IV contrast on hold now as family is concerned about medical bill. Will need to consult with MICHELLE re: coverage   - S&S eval  - Neuro checks per routine  - Aspiration precautions, fall risk protocol, and seizure precautions
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
## 2021-07-01 NOTE — PROGRESS NOTE ADULT - SUBJECTIVE AND OBJECTIVE BOX
[facility] Division of Hospital Medicine  Chris Swanson MD  Pager (M-F, 8A-5P): [phone]  Other Times:  [phone]    Patient is a 56y old  Male who presents with a chief complaint of Seizure and R brain lesion (30 Jun 2021 17:13)    SUBJECTIVE / OVERNIGHT EVENTS:  Patient offers no new complaints.   No F/C, N/V, CP, SOB, Cough, lightheadedness, dizziness, abdominal pain, diarrhea, dysuria.    MEDICATIONS  (STANDING):  dexAMETHasone     Tablet 4 milliGRAM(s) Oral every 6 hours  dextrose 40% Gel 15 Gram(s) Oral once  dextrose 5%. 1000 milliLiter(s) (50 mL/Hr) IV Continuous <Continuous>  dextrose 5%. 1000 milliLiter(s) (100 mL/Hr) IV Continuous <Continuous>  dextrose 50% Injectable 25 Gram(s) IV Push once  dextrose 50% Injectable 12.5 Gram(s) IV Push once  dextrose 50% Injectable 25 Gram(s) IV Push once  glucagon  Injectable 1 milliGRAM(s) IntraMuscular once  insulin lispro (ADMELOG) corrective regimen sliding scale   SubCutaneous three times a day before meals  insulin lispro (ADMELOG) corrective regimen sliding scale   SubCutaneous at bedtime  levETIRAcetam 500 milliGRAM(s) Oral two times a day  pantoprazole    Tablet 40 milliGRAM(s) Oral before breakfast    MEDICATIONS  (PRN):      Vital Signs Last 24 Hrs  T(C): 36.3 (01 Jul 2021 06:09), Max: 36.6 (30 Jun 2021 23:14)  T(F): 97.4 (01 Jul 2021 06:09), Max: 97.8 (30 Jun 2021 23:14)  HR: 66 (01 Jul 2021 06:09) (66 - 71)  BP: 119/72 (01 Jul 2021 06:09) (119/72 - 122/66)  BP(mean): --  RR: 16 (01 Jul 2021 06:09) (14 - 16)  SpO2: 98% (01 Jul 2021 06:09) (95% - 98%)  CAPILLARY BLOOD GLUCOSE      POCT Blood Glucose.: 147 mg/dL (01 Jul 2021 12:44)  POCT Blood Glucose.: 133 mg/dL (01 Jul 2021 08:41)  POCT Blood Glucose.: 155 mg/dL (30 Jun 2021 23:04)  POCT Blood Glucose.: 125 mg/dL (30 Jun 2021 17:19)    I&O's Summary      PHYSICAL EXAM:  GENERAL: NAD  HEAD:  Atraumatic, Normocephalic  EYES: EOMI, PERRLA, conjunctiva and sclera clear  NECK: Supple, No JVD  CHEST/LUNG: Clear to auscultation bilaterally; No wheeze  HEART: Regular rate and rhythm; No murmurs, rubs, or gallops  ABDOMEN: Soft, Nontender, Nondistended; Bowel sounds present  EXTREMITIES:  2+ Peripheral Pulses, No clubbing, cyanosis, or edema  PSYCH: Calm  NEUROLOGY: A/Ox3, non-focal  SKIN: No rashes or lesions    LABS:                        14.6   15.66 )-----------( 287      ( 01 Jul 2021 07:15 )             43.1     07-01    137  |  101  |  22  ----------------------------<  134<H>  4.5   |  23  |  0.73    Ca    9.1      01 Jul 2021 07:15  Phos  4.3     07-01  Mg     2.30     07-01                RADIOLOGY & ADDITIONAL TESTS:    Imaging Personally Reviewed:    Care Discussed with Consultants/Other Providers:

## 2022-09-20 ENCOUNTER — APPOINTMENT (OUTPATIENT)
Dept: NEUROLOGY | Facility: CLINIC | Age: 57
End: 2022-09-20

## 2022-09-20 PROBLEM — Z00.00 ENCOUNTER FOR PREVENTIVE HEALTH EXAMINATION: Status: ACTIVE | Noted: 2022-09-20

## 2023-11-29 NOTE — PROGRESS NOTE ADULT - ATTENDING SUPERVISION STATEMENT
Problem: Discharge Planning  Goal: Discharge to home or other facility with appropriate resources  11/29/2023 0817 by Davi Keyes RN  Outcome: Progressing  11/29/2023 0220 by Blake Chaney RN  Outcome: Progressing     Problem: Pain  Goal: Verbalizes/displays adequate comfort level or baseline comfort level  11/29/2023 0817 by Davi Keyes RN  Outcome: Progressing  11/29/2023 0220 by Blake Chaney RN  Outcome: Progressing     Problem: Skin/Tissue Integrity  Goal: Absence of new skin breakdown  Description: 1. Monitor for areas of redness and/or skin breakdown  2. Assess vascular access sites hourly  3. Every 4-6 hours minimum:  Change oxygen saturation probe site  4. Every 4-6 hours:  If on nasal continuous positive airway pressure, respiratory therapy assess nares and determine need for appliance change or resting period.   11/29/2023 0817 by Davi Keyes RN  Outcome: Progressing  11/29/2023 0220 by Blake Chaney RN  Outcome: Progressing     Problem: Safety - Adult  Goal: Free from fall injury  11/29/2023 0817 by Davi Keyes RN  Outcome: Progressing  11/29/2023 0220 by Blake Chaney RN  Outcome: Progressing     Problem: ABCDS Injury Assessment  Goal: Absence of physical injury  11/29/2023 0817 by Davi Keyes RN  Outcome: Progressing  11/29/2023 0220 by Blake Chaney RN  Outcome: Progressing     Problem: Risk for Elopement  Goal: Patient will not exit the unit/facility without proper excort  11/29/2023 0817 by Davi Keyes RN  Outcome: Progressing  Flowsheets  Taken 11/29/2023 0815 by Davi Keyes RN  Nursing Interventions for Elopement Risk: Make sure patient has all necessary personal care items  Taken 11/29/2023 0415 by Blake Chaney RN  Nursing Interventions for Elopement Risk: Make sure patient has all necessary personal care items  11/29/2023 0220 by Blake Chaney Adult  Goal: Return mobility to safest level of function  11/29/2023 0817 by Marcy Ochoa RN  Outcome: Progressing  11/29/2023 0220 by Birgit Michael RN  Outcome: Progressing  Goal: Maintain proper alignment of affected body part  11/29/2023 0817 by Marcy Ochoa RN  Outcome: Progressing  11/29/2023 0220 by Birgit Michael RN  Outcome: Progressing  Goal: Return ADL status to a safe level of function  11/29/2023 0817 by Marcy Ochoa RN  Outcome: Progressing  11/29/2023 0220 by Birgit Michael RN  Outcome: Progressing     Problem: Gastrointestinal - Adult  Goal: Minimal or absence of nausea and vomiting  11/29/2023 0817 by Marcy Ochoa RN  Outcome: Progressing  11/29/2023 0220 by Birgit Michael RN  Outcome: Progressing  Goal: Maintains or returns to baseline bowel function  11/29/2023 0817 by Marcy Ochoa RN  Outcome: Progressing  11/29/2023 0220 by Birgit Michael RN  Outcome: Progressing  Goal: Maintains adequate nutritional intake  11/29/2023 0817 by Marcy Ochoa RN  Outcome: Progressing  11/29/2023 0220 by Birgit Michael RN  Outcome: Progressing     Problem: Metabolic/Fluid and Electrolytes - Adult  Goal: Electrolytes maintained within normal limits  11/29/2023 0817 by Marcy Ochoa RN  Outcome: Progressing  11/29/2023 0220 by Birgit Michael RN  Outcome: Progressing  Goal: Hemodynamic stability and optimal renal function maintained  11/29/2023 0817 by Marcy Ochoa RN  Outcome: Progressing  11/29/2023 0220 by Birgit Michael RN  Outcome: Progressing     Problem: Behavior  Goal: Pt/Family maintain appropriate behavior and adhere to behavioral management agreement, if implemented  Description: INTERVENTIONS:  1. Assess patient/family's coping skills and  non-compliant behavior (including use of illegal substances)  2.  Notify security of behavior or suspected illegal Resident/Student